# Patient Record
Sex: FEMALE | Race: WHITE | NOT HISPANIC OR LATINO | Employment: UNEMPLOYED | URBAN - NONMETROPOLITAN AREA
[De-identification: names, ages, dates, MRNs, and addresses within clinical notes are randomized per-mention and may not be internally consistent; named-entity substitution may affect disease eponyms.]

---
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
## 2022-05-10 ENCOUNTER — HOSPITAL ENCOUNTER (EMERGENCY)
Facility: HOSPITAL | Age: 22
Discharge: HOME | End: 2022-05-11
Attending: EMERGENCY MEDICINE | Admitting: EMERGENCY MEDICINE

## 2022-05-10 ENCOUNTER — APPOINTMENT (OUTPATIENT)
Dept: GENERAL RADIOLOGY | Facility: HOSPITAL | Age: 22
End: 2022-05-10

## 2022-05-10 DIAGNOSIS — R10.33 PERIUMBILICAL ABDOMINAL PAIN: Primary | ICD-10-CM

## 2022-05-10 LAB
ALBUMIN SERPL-MCNC: 4.44 G/DL (ref 3.5–5.2)
ALBUMIN/GLOB SERPL: 1.5 G/DL
ALP SERPL-CCNC: 84 U/L (ref 39–117)
ALT SERPL W P-5'-P-CCNC: 18 U/L (ref 1–33)
ANION GAP SERPL CALCULATED.3IONS-SCNC: 11.2 MMOL/L (ref 5–15)
AST SERPL-CCNC: 16 U/L (ref 1–32)
BASOPHILS # BLD AUTO: 0.06 10*3/MM3 (ref 0–0.2)
BASOPHILS NFR BLD AUTO: 0.5 % (ref 0–1.5)
BILIRUB SERPL-MCNC: 0.3 MG/DL (ref 0–1.2)
BILIRUB UR QL STRIP: NEGATIVE
BUN SERPL-MCNC: 10 MG/DL (ref 6–20)
BUN/CREAT SERPL: 13.2 (ref 7–25)
CALCIUM SPEC-SCNC: 9.7 MG/DL (ref 8.6–10.5)
CHLORIDE SERPL-SCNC: 100 MMOL/L (ref 98–107)
CLARITY UR: CLEAR
CO2 SERPL-SCNC: 22.8 MMOL/L (ref 22–29)
COLOR UR: YELLOW
CREAT SERPL-MCNC: 0.76 MG/DL (ref 0.57–1)
DEPRECATED RDW RBC AUTO: 41.3 FL (ref 37–54)
EGFRCR SERPLBLD CKD-EPI 2021: 114.5 ML/MIN/1.73
EOSINOPHIL # BLD AUTO: 0.05 10*3/MM3 (ref 0–0.4)
EOSINOPHIL NFR BLD AUTO: 0.4 % (ref 0.3–6.2)
ERYTHROCYTE [DISTWIDTH] IN BLOOD BY AUTOMATED COUNT: 13 % (ref 12.3–15.4)
GLOBULIN UR ELPH-MCNC: 3 GM/DL
GLUCOSE SERPL-MCNC: 109 MG/DL (ref 65–99)
GLUCOSE UR STRIP-MCNC: NEGATIVE MG/DL
HCG SERPL QL: NEGATIVE
HCT VFR BLD AUTO: 38.8 % (ref 34–46.6)
HGB BLD-MCNC: 13.1 G/DL (ref 12–15.9)
HGB UR QL STRIP.AUTO: NEGATIVE
IMM GRANULOCYTES # BLD AUTO: 0.06 10*3/MM3 (ref 0–0.05)
IMM GRANULOCYTES NFR BLD AUTO: 0.5 % (ref 0–0.5)
KETONES UR QL STRIP: NEGATIVE
LEUKOCYTE ESTERASE UR QL STRIP.AUTO: NEGATIVE
LYMPHOCYTES # BLD AUTO: 2.63 10*3/MM3 (ref 0.7–3.1)
LYMPHOCYTES NFR BLD AUTO: 21.8 % (ref 19.6–45.3)
MCH RBC QN AUTO: 29.5 PG (ref 26.6–33)
MCHC RBC AUTO-ENTMCNC: 33.8 G/DL (ref 31.5–35.7)
MCV RBC AUTO: 87.4 FL (ref 79–97)
MONOCYTES # BLD AUTO: 0.66 10*3/MM3 (ref 0.1–0.9)
MONOCYTES NFR BLD AUTO: 5.5 % (ref 5–12)
NEUTROPHILS NFR BLD AUTO: 71.3 % (ref 42.7–76)
NEUTROPHILS NFR BLD AUTO: 8.62 10*3/MM3 (ref 1.7–7)
NITRITE UR QL STRIP: NEGATIVE
NRBC BLD AUTO-RTO: 0 /100 WBC (ref 0–0.2)
PH UR STRIP.AUTO: 7 [PH] (ref 5–8)
PLATELET # BLD AUTO: 369 10*3/MM3 (ref 140–450)
PMV BLD AUTO: 10 FL (ref 6–12)
POTASSIUM SERPL-SCNC: 3.9 MMOL/L (ref 3.5–5.2)
PROT SERPL-MCNC: 7.4 G/DL (ref 6–8.5)
PROT UR QL STRIP: NEGATIVE
RBC # BLD AUTO: 4.44 10*6/MM3 (ref 3.77–5.28)
SODIUM SERPL-SCNC: 134 MMOL/L (ref 136–145)
SP GR UR STRIP: 1.01 (ref 1–1.03)
UROBILINOGEN UR QL STRIP: NORMAL
WBC NRBC COR # BLD: 12.08 10*3/MM3 (ref 3.4–10.8)

## 2022-05-10 PROCEDURE — 99283 EMERGENCY DEPT VISIT LOW MDM: CPT

## 2022-05-10 PROCEDURE — 93005 ELECTROCARDIOGRAM TRACING: CPT | Performed by: EMERGENCY MEDICINE

## 2022-05-10 PROCEDURE — 81003 URINALYSIS AUTO W/O SCOPE: CPT | Performed by: EMERGENCY MEDICINE

## 2022-05-10 PROCEDURE — 85025 COMPLETE CBC W/AUTO DIFF WBC: CPT | Performed by: EMERGENCY MEDICINE

## 2022-05-10 PROCEDURE — 36415 COLL VENOUS BLD VENIPUNCTURE: CPT

## 2022-05-10 PROCEDURE — 71045 X-RAY EXAM CHEST 1 VIEW: CPT

## 2022-05-10 PROCEDURE — 84703 CHORIONIC GONADOTROPIN ASSAY: CPT | Performed by: EMERGENCY MEDICINE

## 2022-05-10 PROCEDURE — 80053 COMPREHEN METABOLIC PANEL: CPT | Performed by: EMERGENCY MEDICINE

## 2022-05-10 RX ORDER — SODIUM CHLORIDE 0.9 % (FLUSH) 0.9 %
10 SYRINGE (ML) INJECTION AS NEEDED
Status: DISCONTINUED | OUTPATIENT
Start: 2022-05-10 | End: 2022-05-11 | Stop reason: HOSPADM

## 2022-05-11 ENCOUNTER — APPOINTMENT (OUTPATIENT)
Dept: ULTRASOUND IMAGING | Facility: HOSPITAL | Age: 22
End: 2022-05-11

## 2022-05-11 ENCOUNTER — APPOINTMENT (OUTPATIENT)
Dept: CT IMAGING | Facility: HOSPITAL | Age: 22
End: 2022-05-11

## 2022-05-11 VITALS
WEIGHT: 185 LBS | HEART RATE: 98 BPM | OXYGEN SATURATION: 99 % | SYSTOLIC BLOOD PRESSURE: 120 MMHG | BODY MASS INDEX: 34.04 KG/M2 | HEIGHT: 62 IN | DIASTOLIC BLOOD PRESSURE: 80 MMHG | RESPIRATION RATE: 16 BRPM | TEMPERATURE: 98 F

## 2022-05-11 LAB
QT INTERVAL: 372 MS
QTC INTERVAL: 401 MS

## 2022-05-11 PROCEDURE — 74176 CT ABD & PELVIS W/O CONTRAST: CPT

## 2022-05-11 RX ORDER — HYDROCODONE BITARTRATE AND ACETAMINOPHEN 5; 325 MG/1; MG/1
1 TABLET ORAL EVERY 8 HOURS PRN
Qty: 6 TABLET | Refills: 0 | Status: SHIPPED | OUTPATIENT
Start: 2022-05-11

## 2022-05-11 RX ORDER — HYDROCODONE BITARTRATE AND ACETAMINOPHEN 7.5; 325 MG/1; MG/1
1 TABLET ORAL ONCE
Status: COMPLETED | OUTPATIENT
Start: 2022-05-11 | End: 2022-05-11

## 2022-05-11 RX ADMIN — HYDROCODONE BITARTRATE AND ACETAMINOPHEN 1 TABLET: 7.5; 325 TABLET ORAL at 02:02

## 2022-05-11 NOTE — ED PROVIDER NOTES
Subjective   Patient presents to ER with abdominal pain which started at six PM. It began jesi-umbilical area, but now is in the RLQ      Abdominal Pain  Pain location:  RLQ  Pain quality: aching and cramping    Pain radiates to:  Does not radiate  Pain severity:  Moderate  Onset quality:  Gradual  Duration:  6 hours  Timing:  Constant  Progression:  Worsening  Chronicity:  New  Relieved by:  Nothing  Worsened by:  Nothing  Ineffective treatments:  None tried  Associated symptoms: anorexia        Review of Systems   Constitutional: Positive for activity change and appetite change.   HENT: Negative.    Eyes: Negative.    Respiratory: Negative.    Cardiovascular: Negative.    Gastrointestinal: Positive for abdominal pain and anorexia.   Endocrine: Negative.    Genitourinary: Negative.    Musculoskeletal: Negative.    Skin: Negative.    Allergic/Immunologic: Negative.    Neurological: Negative.    Hematological: Negative.    Psychiatric/Behavioral: Negative.        No past medical history on file.    No Known Allergies    No past surgical history on file.    No family history on file.    Social History     Socioeconomic History   • Marital status: Single           Objective   Physical Exam  Vitals and nursing note reviewed.   Constitutional:       Appearance: Normal appearance.   HENT:      Head: Normocephalic.      Nose: Nose normal.      Mouth/Throat:      Mouth: Mucous membranes are moist.   Eyes:      Pupils: Pupils are equal, round, and reactive to light.   Cardiovascular:      Rate and Rhythm: Tachycardia present.      Pulses: Normal pulses.   Pulmonary:      Effort: Pulmonary effort is normal.   Abdominal:      Tenderness: There is abdominal tenderness.      Comments: Some guarding RLQ, bowel sounds diminished   Musculoskeletal:      Cervical back: Normal range of motion.   Skin:     General: Skin is warm.      Capillary Refill: Capillary refill takes less than 2 seconds.   Neurological:      General: No focal  deficit present.      Mental Status: She is alert.   Psychiatric:         Mood and Affect: Mood normal.         Procedures           ED Course  ED Course as of 05/11/22 0144   Wed May 11, 2022   0002 eCG 22:40 NSR with sinus arrhythmia, rate 70. Normal ECG. QT/QTc 372/401 [SIMON]   0141 cT : fatty liver [SIMON]      ED Course User Index  [SIMON] Fernando Wills MD                                                 [facility]    Final diagnoses:   Periumbilical abdominal pain       ED Disposition  ED Disposition     ED Disposition   Discharge    Condition   Stable    Comment   --             No follow-up provider specified.       Medication List      New Prescriptions    HYDROcodone-acetaminophen 5-325 MG per tablet  Commonly known as: NORCO  Take 1 tablet by mouth Every 8 (Eight) Hours As Needed for Moderate Pain .           Where to Get Your Medications      These medications were sent to [facility] - [place], [place] - [address] [facility] [address] - [phone]  - [phone] FX  [address] [facility] [address]    Phone: [phone]   · HYDROcodone-acetaminophen 5-325 MG per tablet          Fernando Wills MD  05/11/22 0144

## 2022-05-11 NOTE — DISCHARGE INSTRUCTIONS
Call one of the offices below to establish a primary care provider.  If you are unable to get an appointment and feel it is an emergency and need to be seen immediately please return to the Emergency Department.    Call one of the office below to set up a primary care provider.    Dr. Hardy Haley                                                                                                       [address] [facility] [address]  [phone]    Dr. Muller, Dr. SYLVIA Correa, Dr. DEON Correa ([facility])  [address] [facility] [address]  [phone]    Dr. Easley, Dr. Green, Dr. Obrien ([facility])  [address] [facility] [address]  [phone]    Dr. Arellano  [address] [facility] [address]  [phone]    Dr. Smith, Dr. Muro, Dr. Johnson, Dr. Siddiqui ([facility])  [address] [place] [address] [address]  [facility] [address]  [phone]    Dr. Alejandra Beck  [address] [facility] [place] [address]  [phone]    Dr. Dee Evans  [address]   [address]  [facility] [address]  [phone]    Dr. Haley  [address] [facility] [address]  [phone]    Dr. Shipley, Dr. Rodgers  [address] [facility] [place] [address]  [phone]    Dr. Lopes  [address]                                                              [address]  [facility] [address]  [phone]    Dr. Bates  [address] [facility] [address]  [phone]    Dr. Chiara Barnett  [address]  [address]  [place] [address]  [phone]    Dr. Godinez and [facility]   [address] [facility]  [address]  [place] [address]  [phone]

## 2023-05-22 ENCOUNTER — HOSPITAL ENCOUNTER (EMERGENCY)
Facility: HOSPITAL | Age: 23
Discharge: HOME | End: 2023-05-22
Attending: STUDENT IN AN ORGANIZED HEALTH CARE EDUCATION/TRAINING PROGRAM | Admitting: STUDENT IN AN ORGANIZED HEALTH CARE EDUCATION/TRAINING PROGRAM
Payer: COMMERCIAL

## 2023-05-22 VITALS
RESPIRATION RATE: 16 BRPM | BODY MASS INDEX: 34.23 KG/M2 | HEART RATE: 61 BPM | DIASTOLIC BLOOD PRESSURE: 85 MMHG | TEMPERATURE: 97.9 F | OXYGEN SATURATION: 99 % | WEIGHT: 186 LBS | HEIGHT: 62 IN | SYSTOLIC BLOOD PRESSURE: 132 MMHG

## 2023-05-22 DIAGNOSIS — H72.92 EAR DRUM PERFORATION, LEFT: Primary | ICD-10-CM

## 2023-05-22 PROCEDURE — 99282 EMERGENCY DEPT VISIT SF MDM: CPT

## 2023-05-22 RX ORDER — OFLOXACIN 3 MG/ML
5 SOLUTION AURICULAR (OTIC) DAILY
Qty: 2 ML | Refills: 0 | Status: SHIPPED | OUTPATIENT
Start: 2023-05-22 | End: 2023-05-29

## 2023-05-22 NOTE — ED PROVIDER NOTES
Subjective   History of Present Illness     Jeanna is a 22 yo presenting to the ED for ear pain. Patient reports that she was cleaning her (L) ear out today with a q tip and following that she can not hear in her left ear. Denies pain. She feels like there is something lodged in her ear. She denies fevers, cough, congestion or other infectious like symptoms. No other trauma to the ear.     Review of Systems   Constitutional: Negative.  Negative for activity change and fever.   HENT: Negative.  Negative for congestion.         Hearing loss. Denies auricular pain   Respiratory: Negative.  Negative for cough and shortness of breath.    Cardiovascular: Negative.  Negative for chest pain.   Gastrointestinal: Negative.  Negative for abdominal pain.   Endocrine: Negative.    Genitourinary: Negative.  Negative for dysuria.   Skin: Negative.    Neurological: Negative.  Negative for dizziness, weakness and light-headedness.   Psychiatric/Behavioral: Negative.    All other systems reviewed and are negative.      No past medical history on file.    No Known Allergies    No past surgical history on file.    No family history on file.    Social History     Socioeconomic History   • Marital status: Single           Objective   Physical Exam  Constitutional:       Appearance: Normal appearance.   HENT:      Head: Normocephalic and atraumatic.      Right Ear: Tympanic membrane normal.      Ears:      Comments: No TM can be seen in left ear. Granulated tissue possibly in ear. No foreign body noted. d     Nose: Nose normal. No congestion or rhinorrhea.      Mouth/Throat:      Mouth: Mucous membranes are moist.      Pharynx: No oropharyngeal exudate or posterior oropharyngeal erythema.   Eyes:      Extraocular Movements: Extraocular movements intact.      Pupils: Pupils are equal, round, and reactive to light.   Cardiovascular:      Rate and Rhythm: Normal rate and regular rhythm.   Pulmonary:      Effort: Pulmonary effort is normal.  No respiratory distress.      Breath sounds: Normal breath sounds.   Abdominal:      General: Abdomen is flat. Bowel sounds are normal. There is no distension.   Musculoskeletal:         General: No swelling. Normal range of motion.      Cervical back: Normal range of motion. No rigidity or tenderness.   Skin:     General: Skin is warm.      Capillary Refill: Capillary refill takes less than 2 seconds.      Coloration: Skin is not jaundiced or pale.   Neurological:      General: No focal deficit present.      Mental Status: She is alert and oriented to person, place, and time.         Procedures           ED Course                                           Medical Decision Making  Jeanna is a 22 yo presenting with hearing loss after cleaning ear with q tip. Patient is afebrile and hemodynamically stale on arrival. Physical exam possible ruptured TM on left ear. No impacted foreign body noted. Patient is given ear drops for further evaluation and given referral to Dr. Tony.     Ear drum perforation, left: complicated acute illness or injury  Risk  Prescription drug management.          Final diagnoses:   Ear drum perforation, left       ED Disposition  ED Disposition     ED Disposition   Discharge    Condition   Stable    Comment   --             [place] Tony MD  [address] [facility] [address]  [facility] [address]  [phone]    Go in 2 days  call to set up appointment         Medication List      New Prescriptions    ofloxacin 0.3 % otic solution  Commonly known as: FLOXIN  Administer 5 drops to the right ear Daily for 7 days.           Where to Get Your Medications      These medications were sent to [facility] - [place], [place] - [address] [facility] [address] - [phone]  - [phone] FX  [address] [facility] [address], [facility] [address]    Phone: [phone]   · ofloxacin 0.3 % otic solution          Kenna Blackman MD  05/22/23 5674

## 2023-05-22 NOTE — ED NOTES
MEDICAL SCREENING:    Reason for Visit: possible fb in ear, could be wax.     Patient initially seen in triage.  The patient was advised further evaluation and diagnostic testing will be needed, some of the treatment and testing will be initiated in the lobby in order to begin the process.  The patient will be returned to the waiting area for the time being and possibly be re-assessed by a subsequent ED provider.  The patient will be brought back to the treatment area in as timely manner as possible.         Greg Hunt II, PA  05/22/23 0123

## 2023-05-22 NOTE — DISCHARGE INSTRUCTIONS
Please call Dr. Tony/ENT to discuss further management of your ear, may also see your primary care physician in 2-3 days. Please avoid adding anything into your ear other than ear drops. Please return if symptoms worsen.

## 2024-05-06 LAB
EXTERNAL ABO GROUPING: NORMAL
EXTERNAL ANTIBODY SCREEN: NEGATIVE
EXTERNAL HEPATITIS B SURFACE ANTIGEN: NEGATIVE
EXTERNAL HEPATITIS C AB: NEGATIVE
EXTERNAL RH FACTOR: POSITIVE
EXTERNAL SYPHILIS RPR SCREEN: NORMAL
HIV1 P24 AG SERPL QL IA: NEGATIVE

## 2024-08-11 ENCOUNTER — HOSPITAL ENCOUNTER (EMERGENCY)
Facility: HOSPITAL | Age: 24
Discharge: HOME | End: 2024-08-11
Attending: SURGERY | Admitting: SURGERY
Payer: COMMERCIAL

## 2024-08-11 ENCOUNTER — NURSE TRIAGE (OUTPATIENT)
Dept: CALL CENTER | Facility: HOSPITAL | Age: 24
End: 2024-08-11
Payer: COMMERCIAL

## 2024-08-11 VITALS
SYSTOLIC BLOOD PRESSURE: 142 MMHG | BODY MASS INDEX: 35.33 KG/M2 | DIASTOLIC BLOOD PRESSURE: 87 MMHG | WEIGHT: 192 LBS | HEIGHT: 62 IN | TEMPERATURE: 98.4 F | HEART RATE: 126 BPM | RESPIRATION RATE: 14 BRPM | OXYGEN SATURATION: 99 %

## 2024-08-11 DIAGNOSIS — I10 PRIMARY HYPERTENSION: Primary | ICD-10-CM

## 2024-08-11 DIAGNOSIS — G43.819 OTHER MIGRAINE WITHOUT STATUS MIGRAINOSUS, INTRACTABLE: ICD-10-CM

## 2024-08-11 PROCEDURE — 99283 EMERGENCY DEPT VISIT LOW MDM: CPT

## 2024-08-11 RX ORDER — BUTALBITAL, ACETAMINOPHEN AND CAFFEINE 50; 325; 40 MG/1; MG/1; MG/1
1 TABLET ORAL ONCE
Status: COMPLETED | OUTPATIENT
Start: 2024-08-11 | End: 2024-08-11

## 2024-08-11 RX ADMIN — BUTALBITAL, ACETAMINOPHEN AND CAFFEINE 1 TABLET: 325; 50; 40 TABLET ORAL at 18:31

## 2024-08-11 NOTE — TELEPHONE ENCOUNTER
"Caller advises that she has been having headaches and facial flushing for past 2 weeks with worsening over past 2 days. Checked BP and was 133/90. Caller is 18 weeks pregnant and also has a Hx. Of DM. Advised caller to go to ED at [facility] for further evaluation for borderline PIH. Verbalizes understanding and is agreeable and will go.           Reason for Disposition   [1] Systolic BP  >= 160 OR Diastolic >= 100 AND [2] cardiac (e.g., breathing difficulty, chest pain) or neurologic symptoms (e.g., new-onset blurred or double vision, unsteady gait)    Additional Information   Negative: Difficult to awaken or acting confused (e.g., disoriented, slurred speech)   Negative: SEVERE difficulty breathing (e.g., struggling for each breath, speaks in single words)   Negative: [1] Weakness of the face, arm or leg on one side of the body AND [2] new-onset   Negative: [1] Numbness (i.e., loss of sensation) of the face, arm or leg on one side of the body AND [2] new-onset   Negative: [1] Chest pain lasts > 5 minutes AND [2] history of heart disease (i.e., heart attack, bypass surgery, angina, angioplasty, CHF)   Negative: [1] Chest pain AND [2] took nitrogylcerin AND [3] pain was not relieved   Negative: Sounds like a life-threatening emergency to the triager   Negative: Symptom is main concern (e.g., headache, chest pain)   Negative: Low blood pressure is main concern    Answer Assessment - Initial Assessment Questions  1. BLOOD PRESSURE: \"What is the blood pressure?\" \"Did you take at least two measurements 5 minutes apart?\"      133/90  2. ONSET: \"When did you take your blood pressure?\"      30 minute before calling 4pm  3. HOW: \"How did you take your blood pressure?\" (e.g., automatic home BP monitor, visiting nurse)      Automatic cuff  4. HISTORY: \"Do you have a history of high blood pressure?\"      No   5. MEDICINES: \"Are you taking any medicines for blood pressure?\" \"Have you missed any doses recently?\"      No  6. OTHER " "SYMPTOMS: \"Do you have any symptoms?\" (e.g., blurred vision, chest pain, difficulty breathing, headache, weakness)      Headache and facial flushing  7. PREGNANCY: \"Is there any chance you are pregnant?\" \"When was your last menstrual period?\"      Yes 18 weeks    Protocols used: Blood Pressure - High-ADULT-AH    "

## 2024-08-11 NOTE — ED PROVIDER NOTES
Subjective   History of Present Illness  Ms. La is a 24-year-old female who presents with complaints of migraines and headaches.  She is 18 weeks pregnant.  Blood pressure at home was systolic of 138.  Systolic here 142.  She is established with an OB/GYN.  She also reports a headache that has been present for the past week.  Is been located in the front part of her head.        Review of Systems   Constitutional: Negative.    HENT: Negative.     Eyes: Negative.    Respiratory: Negative.     Cardiovascular: Negative.    Gastrointestinal: Negative.    Endocrine: Negative.    Genitourinary: Negative.    Musculoskeletal: Negative.    Skin: Negative.    Allergic/Immunologic: Negative.    Neurological: Negative.    Hematological: Negative.    Psychiatric/Behavioral: Negative.         No past medical history on file.    No Known Allergies    No past surgical history on file.    No family history on file.    Social History     Socioeconomic History    Marital status: Single           Objective   Physical Exam  Constitutional:       Appearance: Normal appearance.   HENT:      Head: Normocephalic.      Nose: Nose normal.      Mouth/Throat:      Mouth: Mucous membranes are moist.   Eyes:      Pupils: Pupils are equal, round, and reactive to light.   Cardiovascular:      Rate and Rhythm: Normal rate and regular rhythm.      Pulses: Normal pulses.   Pulmonary:      Effort: Pulmonary effort is normal.   Abdominal:      General: Abdomen is flat.   Musculoskeletal:         General: Normal range of motion.      Cervical back: Normal range of motion.   Skin:     General: Skin is warm.      Capillary Refill: Capillary refill takes less than 2 seconds.   Neurological:      General: No focal deficit present.      Mental Status: She is alert.   Psychiatric:         Mood and Affect: Mood normal.         Behavior: Behavior normal.         Procedures           ED Course  ED Course as of 08/11/24 1924   Sun Aug 11, 2024   6674 Heart  Rate(!): 126 [EA]      ED Course User Index  [EA] Justine Alba MD                                             Medical Decision Making  Ms. La is a 24-year-old female who presents with headache and very mild hypertension.  She is 18 weeks pregnant and would not meet criteria for preeclampsia at this time.  Treated her headache with a tablet of Fioricet and recommended Tylenol for home headaches.  Recommended she call her OB/GYN on Monday to further follow her low-grade hypertension.  Patient expressed understanding and felt comfortable to be discharged home.    Problems Addressed:  Other migraine without status migrainosus, intractable: complicated acute illness or injury  Primary hypertension: complicated acute illness or injury    Risk  Prescription drug management.        Final diagnoses:   Primary hypertension   Other migraine without status migrainosus, intractable       ED Disposition  ED Disposition       ED Disposition   Discharge    Condition   Stable    Comment   --               ob/gyn  Patient established with ob/gyn, recommended patient call on Monday to discuss ER presentation  In 1 day           Medication List      No changes were made to your prescriptions during this visit.            Justine Alba MD  08/11/24 0664

## 2024-09-13 ENCOUNTER — HOSPITAL ENCOUNTER (OUTPATIENT)
Facility: HOSPITAL | Age: 24
Discharge: HOME | End: 2024-09-13
Attending: OBSTETRICS & GYNECOLOGY | Admitting: OBSTETRICS & GYNECOLOGY
Payer: COMMERCIAL

## 2024-09-13 VITALS
BODY MASS INDEX: 35.48 KG/M2 | WEIGHT: 192.8 LBS | RESPIRATION RATE: 20 BRPM | TEMPERATURE: 98.2 F | HEART RATE: 96 BPM | SYSTOLIC BLOOD PRESSURE: 113 MMHG | DIASTOLIC BLOOD PRESSURE: 73 MMHG | HEIGHT: 62 IN

## 2024-09-13 PROBLEM — O42.90 AMNIOTIC FLUID LEAKING: Status: ACTIVE | Noted: 2024-09-13

## 2024-09-13 LAB — A1 MICROGLOB PLACENTAL VAG QL: NEGATIVE

## 2024-09-13 PROCEDURE — 84112 EVAL AMNIOTIC FLUID PROTEIN: CPT | Performed by: OBSTETRICS & GYNECOLOGY

## 2024-09-13 PROCEDURE — G0463 HOSPITAL OUTPT CLINIC VISIT: HCPCS

## 2024-09-13 RX ORDER — ASPIRIN 81 MG/1
81 TABLET ORAL DAILY
COMMUNITY

## 2024-09-13 RX ORDER — INSULIN LISPRO 100 [IU]/ML
14 INJECTION, SOLUTION INTRAVENOUS; SUBCUTANEOUS
COMMUNITY

## 2024-09-13 RX ORDER — INSULIN GLARGINE 100 [IU]/ML
22 INJECTION, SOLUTION SUBCUTANEOUS 2 TIMES DAILY
COMMUNITY

## 2024-09-13 RX ORDER — PRENATAL VIT NO.126/IRON/FOLIC 28MG-0.8MG
1 TABLET ORAL DAILY
COMMUNITY

## 2024-09-13 NOTE — LETTER
September 13, 2024     Patient: Jeanna La   YOB: 2000   Date of Visit: 9/13/2024       To Whom It May Concern:    Please excuse Steven Blackman from work on 9/13/2024.           Sincerely,        Marleen Little RN  [facility]/D

## 2024-09-14 NOTE — H&P
"MANOHAR Sage  Obstetric History and Physical    Chief Complaint   Patient presents with    Leaking Fluid     PT C/O POSSIBLE LEAKING FLUID AT APPROX 1200 TODAY. REPORTS \"SOAKING PANTIES\". DENIES VAG BLEEDING. PT RECEIVES PNC AT [facility] IN [place] AND DR. TRAN AT        Subjective     Patient is a 24 y.o. female  currently at 23w2d, who presents with LOF.  Amnisure negative.  Pt has Class B DM and is on insulin followed by [facility] in [place].  Her BS is 90 here per her dexcom device.     Her prenatal care is complicated by  diabetes  pre-gestational.  Her previous obstetric/gynecological history is noted for is non-contributory.    The following portions of the patients history were reviewed and updated as appropriate: current medications, allergies, past medical history, past surgical history, past family history, past social history, and problem list .       Prenatal Information:   Maternal Prenatal Labs  Blood Type No results found for: \"ABO\"   Rh Status No results found for: \"RH\"   Antibody Screen No results found for: \"ABSCRN\"   Rapid Urine Drug Screen No results found for: \"AMPMETHU\", \"BARBITSCNUR\", \"LABBENZSCN\", \"LABMETHSCN\", \"LABOPIASCN\", \"THCURSCR\", \"COCAINEUR\", \"AMPHETSCREEN\", \"PROPOXSCN\", \"BUPRENORSCNU\", \"METAMPSCNUR\", \"OXYCODONESCN\", \"TRICYCLICSCN\"   Group B Strep Culture No results found for: \"GBSANTIGEN\"           External Prenatal Results       Pregnancy Outside Results - Transcribed From Office Records - See Scanned Records For Details       Test Value Date Time    ABO ^ O  24     Rh ^ Positive  24     Antibody Screen ^ Negative  24     Varicella IgG       Rubella       Hgb       Hct       HgB A1c        1h GTT       3h GTT Fasting       3h GTT 1 hour       3h GTT 2 hour       3h GTT 3 hour        Gonorrhea (discrete)       Chlamydia (discrete)       RPR ^ Non-Reactive  24     Syphils cascade: TP-Ab (FTA)       TP-Ab       TP-Ab (EIA)       TPPA       HBsAg ^ Negative  " 24     Herpes Simplex Virus PCR       Herpes Simplex VIrus Culture       HIV ^ Negative  24     Hep C RNA Quant PCR       Hep C Antibody ^ NEGATIVE  24     AFP       NIPT       Cystic Fibroisis        Group B Strep       GBS Susceptibility to Clindamycin       GBS Susceptibility to Erythromycin       Fetal Fibronectin       Genetic Testing, Maternal Blood                 Drug Screening       Test Value Date Time    Urine Drug Screen       Amphetamine Screen       Barbiturate Screen       Benzodiazepine Screen       Methadone Screen       Phencyclidine Screen       Opiates Screen       THC Screen       Cocaine Screen       Propoxyphene Screen       Buprenorphine Screen       Methamphetamine Screen       Oxycodone Screen       Tricyclic Antidepressants Screen                 Legend    ^: Historical                              Past OB History:     OB History    Para Term  AB Living   1 0 0 0 0 0   SAB IAB Ectopic Molar Multiple Live Births   0 0 0 0 0 0      # Outcome Date GA Lbr Stephen/2nd Weight Sex Type Anes PTL Lv   1 Current                Past Medical History: Past Medical History:   Diagnosis Date    Diabetes mellitus     TYPE II ON INSULIN      Past Surgical History Past Surgical History:   Procedure Laterality Date    NO PAST SURGERIES        Family History: No family history on file.   Social History:  reports that she has never smoked. She has never used smokeless tobacco.   reports no history of alcohol use.   reports no history of drug use.        Review of Systems                  Review of Systems   Pertinent items are noted in HPI, all other systems reviewed and negative      Objective     Vital Signs Range for the last 24 hours  Temperature: Temp:  [98.2 °F (36.8 °C)] 98.2 °F (36.8 °C)   Temp Source: Temp src: Oral   BP: BP: (113)/(73) 113/73   Pulse: Heart Rate:  [96] 96   Respirations: Resp:  [20] 20   Weight: Weight:  [87.5 kg (192 lb 12.8 oz)] 87.5 kg (192 lb 12.8  oz)     Physical Examination: General appearance - alert, well appearing, and in no distress  Abdomen - soft, nontender, nondistended, no masses or organomegaly  Extremities - peripheral pulses normal, no pedal edema, no clubbing or cyanosis          Assessment & Plan       Amniotic fluid leaking      Assessment & Plan    Assessment/Plan:  1.  Intrauterine pregnancy at 23w2d weeks gestation with reassuring fetal status.    2.  False Rom- negative amnisure.  Pt reassured.  Keep f/u appt as scheduled.   3. Class B DM- continue insulin as Rx and f/u MFM.       Berta Barbour DO  9/13/2024  20:02 EDT

## 2024-09-14 NOTE — DISCHARGE SUMMARY
"Patient is a 24 y.o. female  currently at 23w2d, who presents with LOF.  Amnisure negative.  Pt has Class B DM and is on insulin followed by [facility] in [place].  Her BS is 90 here per her dexcom device.     Her prenatal care is complicated by  diabetes  pre-gestational.  Her previous obstetric/gynecological history is noted for is non-contributory.    The following portions of the patients history were reviewed and updated as appropriate: current medications, allergies, past medical history, past surgical history, past family history, past social history, and problem list .       Prenatal Information:   Maternal Prenatal Labs  Blood Type No results found for: \"ABO\"   Rh Status No results found for: \"RH\"   Antibody Screen No results found for: \"ABSCRN\"   Rapid Urine Drug Screen No results found for: \"AMPMETHU\", \"BARBITSCNUR\", \"LABBENZSCN\", \"LABMETHSCN\", \"LABOPIASCN\", \"THCURSCR\", \"COCAINEUR\", \"AMPHETSCREEN\", \"PROPOXSCN\", \"BUPRENORSCNU\", \"METAMPSCNUR\", \"OXYCODONESCN\", \"TRICYCLICSCN\"   Group B Strep Culture No results found for: \"GBSANTIGEN\"           External Prenatal Results       Pregnancy Outside Results - Transcribed From Office Records - See Scanned Records For Details       Test Value Date Time    ABO ^ O  24     Rh ^ Positive  24     Antibody Screen ^ Negative  24     Varicella IgG       Rubella       Hgb       Hct       HgB A1c        1h GTT       3h GTT Fasting       3h GTT 1 hour       3h GTT 2 hour       3h GTT 3 hour        Gonorrhea (discrete)       Chlamydia (discrete)       RPR ^ Non-Reactive  24     Syphils cascade: TP-Ab (FTA)       TP-Ab       TP-Ab (EIA)       TPPA       HBsAg ^ Negative  24     Herpes Simplex Virus PCR       Herpes Simplex VIrus Culture       HIV ^ Negative  24     Hep C RNA Quant PCR       Hep C Antibody ^ NEGATIVE  24     AFP       NIPT       Cystic Fibroisis        Group B Strep       GBS Susceptibility to Clindamycin       GBS " Susceptibility to Erythromycin       Fetal Fibronectin       Genetic Testing, Maternal Blood                 Drug Screening       Test Value Date Time    Urine Drug Screen       Amphetamine Screen       Barbiturate Screen       Benzodiazepine Screen       Methadone Screen       Phencyclidine Screen       Opiates Screen       THC Screen       Cocaine Screen       Propoxyphene Screen       Buprenorphine Screen       Methamphetamine Screen       Oxycodone Screen       Tricyclic Antidepressants Screen                 Legend    ^: Historical                              Past OB History:     OB History    Para Term  AB Living   1 0 0 0 0 0   SAB IAB Ectopic Molar Multiple Live Births   0 0 0 0 0 0      # Outcome Date GA Lbr Stephen/2nd Weight Sex Type Anes PTL Lv   1 Current                Past Medical History: Past Medical History:   Diagnosis Date    Diabetes mellitus     TYPE II ON INSULIN      Past Surgical History Past Surgical History:   Procedure Laterality Date    NO PAST SURGERIES        Family History: No family history on file.   Social History:  reports that she has never smoked. She has never used smokeless tobacco.   reports no history of alcohol use.   reports no history of drug use.        Review of Systems                  Review of Systems   Pertinent items are noted in HPI, all other systems reviewed and negative      Objective     Vital Signs Range for the last 24 hours  Temperature: Temp:  [98.2 °F (36.8 °C)] 98.2 °F (36.8 °C)   Temp Source: Temp src: Oral   BP: BP: (113)/(73) 113/73   Pulse: Heart Rate:  [96] 96   Respirations: Resp:  [20] 20   Weight: Weight:  [87.5 kg (192 lb 12.8 oz)] 87.5 kg (192 lb 12.8 oz)     Physical Examination: General appearance - alert, well appearing, and in no distress  Abdomen - soft, nontender, nondistended, no masses or organomegaly  Extremities - peripheral pulses normal, no pedal edema, no clubbing or cyanosis          Assessment & Plan       Amniotic  fluid leaking      Assessment & Plan    Assessment/Plan:  1.  Intrauterine pregnancy at 23w2d weeks gestation with reassuring fetal status.    2.  False Rom- negative amnisure.  Pt reassured.  Keep f/u appt as scheduled.   3. Class B DM- continue insulin as Rx and f/u MFM.

## 2024-09-28 ENCOUNTER — NURSE TRIAGE (OUTPATIENT)
Dept: CALL CENTER | Facility: HOSPITAL | Age: 24
End: 2024-09-28
Payer: COMMERCIAL

## 2024-10-10 ENCOUNTER — HOSPITAL ENCOUNTER (OUTPATIENT)
Facility: HOSPITAL | Age: 24
Discharge: HOME | End: 2024-10-10
Attending: OBSTETRICS & GYNECOLOGY | Admitting: OBSTETRICS & GYNECOLOGY
Payer: COMMERCIAL

## 2024-10-10 VITALS
HEIGHT: 62 IN | BODY MASS INDEX: 35.7 KG/M2 | DIASTOLIC BLOOD PRESSURE: 63 MMHG | OXYGEN SATURATION: 99 % | TEMPERATURE: 98.1 F | WEIGHT: 194 LBS | RESPIRATION RATE: 18 BRPM | HEART RATE: 81 BPM | SYSTOLIC BLOOD PRESSURE: 109 MMHG

## 2024-10-10 PROCEDURE — G0463 HOSPITAL OUTPT CLINIC VISIT: HCPCS

## 2024-10-10 PROCEDURE — 59025 FETAL NON-STRESS TEST: CPT

## 2024-10-10 NOTE — LETTER
October 10, 2024     Patient: Jeanna La   YOB: 2000   Date of Visit: 10/10/2024       To Whom It May Concern:    Please excuse Steven Blackman from work on 10/10/24.       Sincerely,  Marleen Little RN

## 2024-10-11 NOTE — NON STRESS TEST
Jeanna La, a  at 27w1d with an JOSE of 2025, Date entered prior to episode creation, was seen at [facility] LABOR DELIVERY for a nonstress test.    Chief Complaint   Patient presents with    Decreased Fetal Movement     PT ARRIVED TO L/D FOR DFM. DENIES LOF/VB/CTX.       Patient Active Problem List   Diagnosis    Amniotic fluid leaking     10X10  Start Time:   Stop Time:       Interpretation A  Nonstress Test Interpretation A: Reactive  Comments A: VERIFIED BY ELOISE ROBINS RN

## 2024-12-01 ENCOUNTER — NURSE TRIAGE (OUTPATIENT)
Dept: CALL CENTER | Facility: HOSPITAL | Age: 24
End: 2024-12-01
Payer: COMMERCIAL

## 2024-12-01 NOTE — TELEPHONE ENCOUNTER
"Reason for Disposition   Health Information question, no triage required and triager able to answer question    Additional Information   Negative: [1] Caller is not with the adult (patient) AND [2] reporting urgent symptoms   Negative: Lab result questions   Negative: Medication questions   Negative: Caller can't be reached by phone   Negative: Caller has already spoken to PCP or another triager   Negative: RN needs further essential information from caller in order to complete triage   Negative: Requesting regular office appointment   Negative: [1] Caller requesting NON-URGENT health information AND [2] PCP's office is the best resource    Answer Assessment - Initial Assessment Questions  1. REASON FOR CALL or QUESTION: \"What is your reason for calling today?\" or \"How can I best help you?\" or \"What question do you have that I can help answer?\"      Question about meds she can use during pregnancy speficially ear drops    Protocols used: Information Only Call - No Triage-ADULT-    "

## 2024-12-11 LAB — EXTERNAL GROUP B STREP ANTIGEN: NEGATIVE

## 2024-12-23 ENCOUNTER — HOSPITAL ENCOUNTER (INPATIENT)
Facility: HOSPITAL | Age: 24
LOS: 4 days | Discharge: HOME | End: 2024-12-27
Attending: OBSTETRICS & GYNECOLOGY | Admitting: OBSTETRICS & GYNECOLOGY
Payer: COMMERCIAL

## 2024-12-23 DIAGNOSIS — O16.9 HYPERTENSION AFFECTING PREGNANCY, ANTEPARTUM: Primary | ICD-10-CM

## 2024-12-23 LAB
ABO GROUP BLD: NORMAL
ABO GROUP BLD: NORMAL
AMPHET+METHAMPHET UR QL: NEGATIVE
AMPHETAMINES UR QL: NEGATIVE
BARBITURATES UR QL SCN: NEGATIVE
BASOPHILS # BLD AUTO: 0.04 10*3/MM3 (ref 0–0.2)
BASOPHILS NFR BLD AUTO: 0.4 % (ref 0–1.5)
BENZODIAZ UR QL SCN: NEGATIVE
BLD GP AB SCN SERPL QL: NEGATIVE
BUPRENORPHINE SERPL-MCNC: NEGATIVE NG/ML
CANNABINOIDS SERPL QL: NEGATIVE
COCAINE UR QL: NEGATIVE
DEPRECATED RDW RBC AUTO: 43.6 FL (ref 37–54)
EOSINOPHIL # BLD AUTO: 0.03 10*3/MM3 (ref 0–0.4)
EOSINOPHIL NFR BLD AUTO: 0.3 % (ref 0.3–6.2)
ERYTHROCYTE [DISTWIDTH] IN BLOOD BY AUTOMATED COUNT: 14.1 % (ref 12.3–15.4)
FENTANYL UR-MCNC: NEGATIVE NG/ML
HCT VFR BLD AUTO: 34.2 % (ref 34–46.6)
HGB BLD-MCNC: 11.5 G/DL (ref 12–15.9)
IMM GRANULOCYTES # BLD AUTO: 0.04 10*3/MM3 (ref 0–0.05)
IMM GRANULOCYTES NFR BLD AUTO: 0.4 % (ref 0–0.5)
LYMPHOCYTES # BLD AUTO: 2.12 10*3/MM3 (ref 0.7–3.1)
LYMPHOCYTES NFR BLD AUTO: 22.7 % (ref 19.6–45.3)
MCH RBC QN AUTO: 28.6 PG (ref 26.6–33)
MCHC RBC AUTO-ENTMCNC: 33.6 G/DL (ref 31.5–35.7)
MCV RBC AUTO: 85.1 FL (ref 79–97)
METHADONE UR QL SCN: NEGATIVE
MONOCYTES # BLD AUTO: 0.73 10*3/MM3 (ref 0.1–0.9)
MONOCYTES NFR BLD AUTO: 7.8 % (ref 5–12)
NEUTROPHILS NFR BLD AUTO: 6.37 10*3/MM3 (ref 1.7–7)
NEUTROPHILS NFR BLD AUTO: 68.4 % (ref 42.7–76)
NRBC BLD AUTO-RTO: 0 /100 WBC (ref 0–0.2)
OPIATES UR QL: NEGATIVE
OXYCODONE UR QL SCN: NEGATIVE
PCP UR QL SCN: NEGATIVE
PLATELET # BLD AUTO: 236 10*3/MM3 (ref 140–450)
PMV BLD AUTO: 10.5 FL (ref 6–12)
RBC # BLD AUTO: 4.02 10*6/MM3 (ref 3.77–5.28)
RH BLD: POSITIVE
RH BLD: POSITIVE
T&S EXPIRATION DATE: NORMAL
TRICYCLICS UR QL SCN: NEGATIVE
WBC NRBC COR # BLD AUTO: 9.33 10*3/MM3 (ref 3.4–10.8)

## 2024-12-23 PROCEDURE — 86900 BLOOD TYPING SEROLOGIC ABO: CPT | Performed by: OBSTETRICS & GYNECOLOGY

## 2024-12-23 PROCEDURE — 86901 BLOOD TYPING SEROLOGIC RH(D): CPT

## 2024-12-23 PROCEDURE — 86780 TREPONEMA PALLIDUM: CPT | Performed by: OBSTETRICS & GYNECOLOGY

## 2024-12-23 PROCEDURE — G0463 HOSPITAL OUTPT CLINIC VISIT: HCPCS

## 2024-12-23 PROCEDURE — 86850 RBC ANTIBODY SCREEN: CPT | Performed by: OBSTETRICS & GYNECOLOGY

## 2024-12-23 PROCEDURE — 85025 COMPLETE CBC W/AUTO DIFF WBC: CPT | Performed by: OBSTETRICS & GYNECOLOGY

## 2024-12-23 PROCEDURE — 86900 BLOOD TYPING SEROLOGIC ABO: CPT

## 2024-12-23 PROCEDURE — 59025 FETAL NON-STRESS TEST: CPT

## 2024-12-23 PROCEDURE — 80307 DRUG TEST PRSMV CHEM ANLYZR: CPT | Performed by: OBSTETRICS & GYNECOLOGY

## 2024-12-23 PROCEDURE — 86901 BLOOD TYPING SEROLOGIC RH(D): CPT | Performed by: OBSTETRICS & GYNECOLOGY

## 2024-12-23 RX ORDER — INSULIN LISPRO 100 [IU]/ML
14-18 INJECTION, SOLUTION INTRAVENOUS; SUBCUTANEOUS
Status: DISCONTINUED | OUTPATIENT
Start: 2024-12-23 | End: 2024-12-23 | Stop reason: ALTCHOICE

## 2024-12-23 RX ORDER — MAGNESIUM HYDROXIDE 1200 MG/15ML
1000 LIQUID ORAL ONCE AS NEEDED
Status: DISCONTINUED | OUTPATIENT
Start: 2024-12-23 | End: 2024-12-24 | Stop reason: HOSPADM

## 2024-12-23 RX ORDER — ONDANSETRON 4 MG/1
4 TABLET, ORALLY DISINTEGRATING ORAL EVERY 6 HOURS PRN
Status: DISCONTINUED | OUTPATIENT
Start: 2024-12-23 | End: 2024-12-24 | Stop reason: HOSPADM

## 2024-12-23 RX ORDER — SODIUM CHLORIDE, SODIUM LACTATE, POTASSIUM CHLORIDE, CALCIUM CHLORIDE 600; 310; 30; 20 MG/100ML; MG/100ML; MG/100ML; MG/100ML
125 INJECTION, SOLUTION INTRAVENOUS CONTINUOUS
Status: ACTIVE | OUTPATIENT
Start: 2024-12-23 | End: 2024-12-26

## 2024-12-23 RX ORDER — ONDANSETRON 2 MG/ML
4 INJECTION INTRAMUSCULAR; INTRAVENOUS EVERY 6 HOURS PRN
Status: DISCONTINUED | OUTPATIENT
Start: 2024-12-23 | End: 2024-12-24 | Stop reason: HOSPADM

## 2024-12-23 RX ORDER — DEXTROSE MONOHYDRATE 25 G/50ML
25 INJECTION, SOLUTION INTRAVENOUS
Status: DISCONTINUED | OUTPATIENT
Start: 2024-12-23 | End: 2024-12-27 | Stop reason: HOSPADM

## 2024-12-23 RX ORDER — ACETAMINOPHEN 325 MG/1
650 TABLET ORAL EVERY 4 HOURS PRN
Status: DISCONTINUED | OUTPATIENT
Start: 2024-12-23 | End: 2024-12-24 | Stop reason: HOSPADM

## 2024-12-23 RX ORDER — PRENATAL VIT/IRON FUM/FOLIC AC 27MG-0.8MG
1 TABLET ORAL DAILY
Status: DISCONTINUED | OUTPATIENT
Start: 2024-12-24 | End: 2024-12-24 | Stop reason: SDUPTHER

## 2024-12-23 RX ORDER — GLUCAGON 1 MG/ML
1 KIT INJECTION
Status: DISCONTINUED | OUTPATIENT
Start: 2024-12-23 | End: 2024-12-27 | Stop reason: HOSPADM

## 2024-12-23 RX ORDER — INSULIN GLARGINE 100 [IU]/ML
28 INJECTION, SOLUTION SUBCUTANEOUS 2 TIMES DAILY
Status: DISCONTINUED | OUTPATIENT
Start: 2024-12-23 | End: 2024-12-27 | Stop reason: HOSPADM

## 2024-12-23 RX ORDER — BUTORPHANOL TARTRATE 1 MG/ML
1 INJECTION, SOLUTION INTRAMUSCULAR; INTRAVENOUS
Status: DISCONTINUED | OUTPATIENT
Start: 2024-12-23 | End: 2024-12-24 | Stop reason: HOSPADM

## 2024-12-23 RX ORDER — INSULIN LISPRO 100 [IU]/ML
2-9 INJECTION, SOLUTION INTRAVENOUS; SUBCUTANEOUS
Status: DISCONTINUED | OUTPATIENT
Start: 2024-12-23 | End: 2024-12-27 | Stop reason: HOSPADM

## 2024-12-23 RX ORDER — MISOPROSTOL 100 MCG
25 TABLET ORAL
Status: DISPENSED | OUTPATIENT
Start: 2024-12-23 | End: 2024-12-24

## 2024-12-23 RX ORDER — NICOTINE POLACRILEX 4 MG
15 LOZENGE BUCCAL
Status: DISCONTINUED | OUTPATIENT
Start: 2024-12-23 | End: 2024-12-27 | Stop reason: HOSPADM

## 2024-12-23 RX ORDER — TERBUTALINE SULFATE 1 MG/ML
0.2 INJECTION, SOLUTION SUBCUTANEOUS AS NEEDED
Status: DISCONTINUED | OUTPATIENT
Start: 2024-12-23 | End: 2024-12-24 | Stop reason: HOSPADM

## 2024-12-23 RX ORDER — MINERAL OIL
OIL (ML) MISCELLANEOUS ONCE
Status: DISCONTINUED | OUTPATIENT
Start: 2024-12-23 | End: 2024-12-23

## 2024-12-23 RX ORDER — SODIUM CHLORIDE 9 MG/ML
40 INJECTION, SOLUTION INTRAVENOUS AS NEEDED
Status: DISCONTINUED | OUTPATIENT
Start: 2024-12-23 | End: 2024-12-24 | Stop reason: HOSPADM

## 2024-12-23 RX ORDER — BUTORPHANOL TARTRATE 2 MG/ML
2 INJECTION, SOLUTION INTRAMUSCULAR; INTRAVENOUS
Status: DISCONTINUED | OUTPATIENT
Start: 2024-12-23 | End: 2024-12-24 | Stop reason: HOSPADM

## 2024-12-23 RX ADMIN — ACETAMINOPHEN 650 MG: 325 TABLET ORAL at 17:45

## 2024-12-23 NOTE — NON STRESS TEST
Jeanna La, a  at 37w5d with an JOSE of 2025, Date entered prior to episode creation, was seen at [facility] LABOR DELIVERY for a nonstress test.    Chief Complaint   Patient presents with    Hypertension-Pregnant       Patient Active Problem List   Diagnosis    Amniotic fluid leaking    Hypertension affecting pregnancy       Start Time: 1155  Stop Time: 1230    Interpretation A  Nonstress Test Interpretation A: Reactive  Comments A: MB

## 2024-12-23 NOTE — H&P
MANOHAR Sage  Obstetric History and Physical    Chief Complaint   Patient presents with    Hypertension-Pregnant       Subjective     Patient is a 24 y.o. female  currently at 37w5d, who presents with IUGR and GHTN for IOL.    Her prenatal care is complicated by  hypertension  gestational hypertension.  Her previous obstetric/gynecological history is noted for is non-contributory.    The following portions of the patient's history were reviewed and updated as appropriate: current medications, allergies, past medical history, past surgical history, past family history, past social history, and problem list .   Social History     Socioeconomic History    Marital status: Single   Tobacco Use    Smoking status: Never    Smokeless tobacco: Never   Vaping Use    Vaping status: Never Used   Substance and Sexual Activity    Alcohol use: Never    Drug use: Never    Sexual activity: Yes     Past Medical History:   Diagnosis Date    Diabetes mellitus     TYPE II ON INSULIN       Current Facility-Administered Medications:     acetaminophen (TYLENOL) tablet 650 mg, 650 mg, Oral, Q4H PRN, Carolina Khan, , 650 mg at 24 1745    butorphanol (STADOL) injection 1 mg, 1 mg, Intravenous, Q2H PRN, Carolina Khan DO    butorphanol (STADOL) injection 2 mg, 2 mg, Intravenous, Q3H PRN, Carolina Khan DO    dextrose (D50W) (25 g/50 mL) IV injection 25 g, 25 g, Intravenous, Q15 Min PRN, Carolina Khan DO    dextrose (GLUTOSE) oral gel 15 g, 15 g, Oral, Q15 Min PRN, Carolina Khan,     glucagon HCl (Diagnostic) injection 1 mg, 1 mg, Intramuscular, Q15 Min PRN, Carolina Khan DO    insulin glargine (LANTUS, SEMGLEE) injection 28 Units, 28 Units, Subcutaneous, BID, Carolina Khan DO    Insulin Lispro (humaLOG) injection 2-9 Units, 2-9 Units, Subcutaneous, 4x Daily AC & at Bedtime, Carolina Khan DO    lactated ringers bolus 1,000 mL, 1,000 mL,  Intravenous, Once PRN, Carolina Khan DO    lactated ringers infusion, 125 mL/hr, Intravenous, Continuous, Carolina Khan DO    miSOPROStol (CYTOTEC) split tablet 25 mcg, 25 mcg, Vaginal, Q4H, Carolina Khan DO    ondansetron ODT (ZOFRAN-ODT) disintegrating tablet 4 mg, 4 mg, Oral, Q6H PRN **OR** ondansetron (ZOFRAN) injection 4 mg, 4 mg, Intravenous, Q6H PRN, Carolina Khan DO    [START ON 12/24/2024] prenatal vitamin tablet 1 tablet, 1 tablet, Oral, Daily, Carolina Khan DO    sodium chloride (NS) irrigation solution 1,000 mL, 1,000 mL, Irrigation, Once PRN, Carolina Khan,     sodium chloride 0.9 % infusion 40 mL, 40 mL, Intravenous, PRN, Carolina Khan DO    terbutaline (BRETHINE) injection 0.2 mg, 0.2 mg, Subcutaneous, PRN, Carolina Khan,   No Known Allergies  Past Surgical History:   Procedure Laterality Date    NO PAST SURGERIES           Prenatal Information:   Maternal Prenatal Labs  Blood Type ABO Type   Date Value Ref Range Status   12/23/2024 O  Final      Rh Status RH type   Date Value Ref Range Status   12/23/2024 Positive  Final      Antibody Screen Antibody Screen   Date Value Ref Range Status   12/23/2024 Negative  Final      Rapid Urine Drug Screen Barbiturates Screen, Urine   Date Value Ref Range Status   12/23/2024 Negative Negative Final     Benzodiazepine Screen, Urine   Date Value Ref Range Status   12/23/2024 Negative Negative Final     Methadone Screen, Urine   Date Value Ref Range Status   12/23/2024 Negative Negative Final     Opiate Screen   Date Value Ref Range Status   12/23/2024 Negative Negative Final     THC, Screen, Urine   Date Value Ref Range Status   12/23/2024 Negative Negative Final     Cocaine Screen, Urine   Date Value Ref Range Status   12/23/2024 Negative Negative Final     Amphetamine Screen, Urine   Date Value Ref Range Status   12/23/2024 Negative Negative Final     Buprenorphine,  "Screen, Urine   Date Value Ref Range Status   12/23/2024 Negative Negative Final     Methamphetamine, Ur   Date Value Ref Range Status   12/23/2024 Negative Negative Final     Oxycodone Screen, Urine   Date Value Ref Range Status   12/23/2024 Negative Negative Final     Tricyclic Antidepressants Screen   Date Value Ref Range Status   12/23/2024 Negative Negative Final      Group B Strep Culture No results found for: \"GBSANTIGEN\"           External Prenatal Results       Pregnancy Outside Results - Transcribed From Office Records - See Scanned Records For Details       Test Value Date Time    ABO  O  12/23/24 1542    Rh  Positive  12/23/24 1542    Antibody Screen  Negative  12/23/24 1401      ^ Negative  05/06/24     Varicella IgG       Rubella       Hgb  11.5 g/dL 12/23/24 1400      ^ 10.9 g/dL 10/25/24 1121    Hct  34.2 % 12/23/24 1400      ^ 32.9 % 10/25/24 1121    HgB A1c        1h GTT       3h GTT Fasting       3h GTT 1 hour       3h GTT 2 hour       3h GTT 3 hour        Gonorrhea (discrete) ^ Negative  12/11/24 1501    Chlamydia (discrete) ^ Negative  12/11/24 1501    RPR ^ Nonreactive  10/25/24 1121      ^ Non-Reactive  05/06/24     Syphils cascade: TP-Ab (FTA)       TP-Ab       TP-Ab (EIA)       TPPA       HBsAg ^ Negative  05/06/24     Herpes Simplex Virus PCR       Herpes Simplex VIrus Culture       HIV ^ Negative  05/06/24     Hep C RNA Quant PCR       Hep C Antibody ^ NEGATIVE  05/06/24     AFP       NIPT       Cystic Fibrosis ([place]is)       Cystic Fibroisis        Spinal Muscular atrophy       Fragile X       Group B Strep ^ Negative  12/11/24     GBS Susceptibility to Clindamycin       GBS Susceptibility to Erythromycin       Fetal Fibronectin       Genetic Testing, Maternal Blood                 Drug Screening       Test Value Date Time    Urine Drug Screen       Amphetamine Screen  Negative  12/23/24 1329    Barbiturate Screen  Negative  12/23/24 1329    Benzodiazepine Screen  Negative  12/23/24 1329 "    Methadone Screen  Negative  24 1329    Phencyclidine Screen  Negative  24 1329    Opiates Screen  Negative  24 1329    THC Screen  Negative  24 1329    Cocaine Screen       Propoxyphene Screen       Buprenorphine Screen  Negative  24 1329    Methamphetamine Screen       Oxycodone Screen  Negative  24 1329    Tricyclic Antidepressants Screen  Negative  24 1329              Legend    ^: Historical                              Past OB History:     OB History    Para Term  AB Living   1 0 0 0 0 0   SAB IAB Ectopic Molar Multiple Live Births   0 0 0 0 0 0      # Outcome Date GA Lbr Stephen/2nd Weight Sex Type Anes PTL Lv   1 Current                Past Medical History: Past Medical History:   Diagnosis Date    Diabetes mellitus     TYPE II ON INSULIN      Past Surgical History Past Surgical History:   Procedure Laterality Date    NO PAST SURGERIES        Family History: Family History   Problem Relation Age of Onset    Diabetes Father       Social History:  reports that she has never smoked. She has never used smokeless tobacco.   reports no history of alcohol use.   reports no history of drug use.        Review of Systems-all negative except as noted in HPI      Objective     Vital Signs Range for the last 24 hours  Temperature: Temp:  [98.6 °F (37 °C)] 98.6 °F (37 °C)   Temp Source: Temp src: Oral   BP: BP: (121-154)/(66-93) 152/85   Pulse: Heart Rate:  [] 104   Respirations: Resp:  [18] 18   Weight: Weight:  [93 kg (205 lb)] 93 kg (205 lb)     Physical Examination: General appearance - alert, well appearing, and in no distress, oriented to person, place, and time, normal appearing weight and well hydrated  Mental status - alert, oriented to person, place, and time, normal mood, behavior, speech, dress, motor activity, and thought processes, affect appropriate to mood  Neck - supple, no significant adenopathy  Chest - clear to auscultation, no wheezes, rales  or rhonchi, symmetric air entry, no tachypnea, retractions or cyanosis  Heart - normal rate, regular rhythm, normal S1, S2, no murmurs, rubs, clicks or gallops  Abdomen - soft, nontender, gravid uterus, no masses or organomegaly  no rebound tenderness noted,   Pelvic - normal external genitalia, vulva, vagina, cervix, uterus and adnexa  Neurological - alert, oriented, normal speech, no focal findings or movement disorder noted  Musculoskeletal - no joint tenderness, deformity or swelling  Extremities - peripheral pulses normal, no pedal edema, no clubbing or cyanosis  Skin - normal coloration and turgor, no rashes, no suspicious skin lesions noted        Cervix: Exam by:     Dilation:     Effacement:     Station:       Laboratory Results:   Lab Results (last 24 hours)       Procedure Component Value Units Date/Time    Group B Streptococcus Culture - Swab, Vaginal/Rectum [[id number]] Resulted: 12/11/24    Specimen: Swab from Vaginal/Rectum Updated: 12/23/24 1451     External Strep Group B Ag Negative    Fentanyl, Urine - Urine, Clean Catch [[id number]]  (Normal) Collected: 12/23/24 1329    Specimen: Urine, Clean Catch Updated: 12/23/24 1443     Fentanyl, Urine Negative    Narrative:      Negative Threshold:      Fentanyl 5 ng/mL     The normal value for the drug tested is negative. This report includes final unconfirmed screening results to be used for medical treatment purposes only. Unconfirmed results must not be used for non-medical purposes such as employment or legal testing. Clinical consideration should be applied to any drug of abuse test, particularly when unconfirmed results are used.           Urine Drug Screen - Urine, Clean Catch [[id number]]  (Normal) Collected: 12/23/24 1329    Specimen: Urine, Clean Catch Updated: 12/23/24 1435     THC, Screen, Urine Negative     Phencyclidine (PCP), Urine Negative     Cocaine Screen, Urine Negative     Methamphetamine, Ur Negative     Opiate Screen Negative      Amphetamine Screen, Urine Negative     Benzodiazepine Screen, Urine Negative     Tricyclic Antidepressants Screen Negative     Methadone Screen, Urine Negative     Barbiturates Screen, Urine Negative     Oxycodone Screen, Urine Negative     Buprenorphine, Screen, Urine Negative    Narrative:      Cutoff For Drugs Screened:    Amphetamines               500 ng/ml  Barbiturates               200 ng/ml  Benzodiazepines            150 ng/ml  Cocaine                    150 ng/ml  Methadone                  200 ng/ml  Opiates                    100 ng/ml  Phencyclidine               25 ng/ml  THC                         50 ng/ml  Methamphetamine            500 ng/ml  Tricyclic Antidepressants  300 ng/ml  Oxycodone                  100 ng/ml  Buprenorphine               10 ng/ml    The normal value for all drugs tested is negative. This report includes unconfirmed screening results, with the cutoff values listed, to be used for medical treatment purposes only.  Unconfirmed results must not be used for non-medical purposes such as employment or legal testing.  Clinical consideration should be applied to any drug of abuse test, particularly when unconfirmed results are used.      CBC & Differential [[id number]]  (Abnormal) Collected: 12/23/24 1400    Specimen: Blood Updated: 12/23/24 1422    Narrative:      The following orders were created for panel order CBC & Differential.  Procedure                               Abnormality         Status                     ---------                               -----------         ------                     CBC Auto Differential[[id number]]        Abnormal            Final result                 Please view results for these tests on the individual orders.    CBC Auto Differential [[id number]]  (Abnormal) Collected: 12/23/24 1400    Specimen: Blood Updated: 12/23/24 1422     WBC 9.33 10*3/mm3      RBC 4.02 10*6/mm3      Hemoglobin 11.5 g/dL      Hematocrit 34.2 %      MCV 85.1 fL       "MCH 28.6 pg      MCHC 33.6 g/dL      RDW 14.1 %      RDW-SD 43.6 fl      MPV 10.5 fL      Platelets 236 10*3/mm3      Neutrophil % 68.4 %      Lymphocyte % 22.7 %      Monocyte % 7.8 %      Eosinophil % 0.3 %      Basophil % 0.4 %      Immature Grans % 0.4 %      Neutrophils, Absolute 6.37 10*3/mm3      Lymphocytes, Absolute 2.12 10*3/mm3      Monocytes, Absolute 0.73 10*3/mm3      Eosinophils, Absolute 0.03 10*3/mm3      Basophils, Absolute 0.04 10*3/mm3      Immature Grans, Absolute 0.04 10*3/mm3      nRBC 0.0 /100 WBC     Treponema pallidum AB w/Reflex RPR [[id number]] Collected: 12/23/24 1400    Specimen: Blood Updated: 12/23/24 1418          Radiology Review:   Imaging Results (Last 72 Hours)       ** No results found for the last 72 hours. **              Assessment & Plan       Hypertension affecting pregnancy      Assessment & Plan    Assessment:  1.  Intrauterine pregnancy at 37w5d weeks gestation with reassuring fetal status.    2.  induction of labor  for IUGR, GHTN  with unfavorable cervix  3.  Obstetrical history significant for is noncontributory.  4.  GBS status: No results found for: \"GBSANTIGEN\"    Plan:  1. fetal and uterine monitoring  continuously and cervical ripening with  Misoprostol  2. Plan of care has been reviewed with patient   3.  Risks, benefits of treatment plan have been discussed.  4.  All questions have been answered.        Carolina Khan, DO  12/23/2024  17:50 EST    "

## 2024-12-24 ENCOUNTER — ANESTHESIA (OUTPATIENT)
Dept: LABOR AND DELIVERY | Facility: HOSPITAL | Age: 24
End: 2024-12-24
Payer: COMMERCIAL

## 2024-12-24 ENCOUNTER — ANESTHESIA EVENT (OUTPATIENT)
Dept: LABOR AND DELIVERY | Facility: HOSPITAL | Age: 24
End: 2024-12-24
Payer: COMMERCIAL

## 2024-12-24 PROBLEM — Z33.1 IUP (INTRAUTERINE PREGNANCY), INCIDENTAL: Status: ACTIVE | Noted: 2024-12-24

## 2024-12-24 LAB
GLUCOSE BLDC GLUCOMTR-MCNC: 97 MG/DL (ref 70–130)
TREPONEMA PALLIDUM IGG+IGM AB [PRESENCE] IN SERUM OR PLASMA BY IMMUNOASSAY: NORMAL

## 2024-12-24 PROCEDURE — 82948 REAGENT STRIP/BLOOD GLUCOSE: CPT

## 2024-12-24 PROCEDURE — 25810000003 LACTATED RINGERS PER 1000 ML: Performed by: OBSTETRICS & GYNECOLOGY

## 2024-12-24 PROCEDURE — 25010000002 CEFAZOLIN PER 500 MG: Performed by: OBSTETRICS & GYNECOLOGY

## 2024-12-24 PROCEDURE — 25010000002 ONDANSETRON PER 1 MG: Performed by: OBSTETRICS & GYNECOLOGY

## 2024-12-24 PROCEDURE — 25010000002 METOCLOPRAMIDE PER 10 MG: Performed by: OBSTETRICS & GYNECOLOGY

## 2024-12-24 PROCEDURE — 25010000002 BUTORPHANOL PER 1 MG: Performed by: OBSTETRICS & GYNECOLOGY

## 2024-12-24 PROCEDURE — 25810000003 LACTATED RINGERS PER 1000 ML: Performed by: ANESTHESIOLOGY

## 2024-12-24 PROCEDURE — 25010000002 MORPHINE PER 10 MG: Performed by: ANESTHESIOLOGY

## 2024-12-24 PROCEDURE — 25010000002 KETOROLAC TROMETHAMINE PER 15 MG: Performed by: OBSTETRICS & GYNECOLOGY

## 2024-12-24 PROCEDURE — 63710000001 INSULIN GLARGINE PER 5 UNITS: Performed by: OBSTETRICS & GYNECOLOGY

## 2024-12-24 PROCEDURE — 25010000002 BUPIVACAINE PF 0.75 % SOLUTION: Performed by: ANESTHESIOLOGY

## 2024-12-24 PROCEDURE — 25010000002 FENTANYL CITRATE (PF) 50 MCG/ML SOLUTION: Performed by: ANESTHESIOLOGY

## 2024-12-24 DEVICE — SEAL HEMO SURG ARISTA/AH ABS/PWDR 3GM: Type: IMPLANTABLE DEVICE | Site: UTERUS | Status: FUNCTIONAL

## 2024-12-24 RX ORDER — BUPIVACAINE HYDROCHLORIDE 7.5 MG/ML
INJECTION, SOLUTION EPIDURAL; RETROBULBAR
Status: COMPLETED | OUTPATIENT
Start: 2024-12-24 | End: 2024-12-24

## 2024-12-24 RX ORDER — ONDANSETRON 2 MG/ML
4 INJECTION INTRAMUSCULAR; INTRAVENOUS ONCE AS NEEDED
Status: DISCONTINUED | OUTPATIENT
Start: 2024-12-24 | End: 2024-12-27 | Stop reason: HOSPADM

## 2024-12-24 RX ORDER — ACETAMINOPHEN 325 MG/1
650 TABLET ORAL EVERY 4 HOURS PRN
Status: DISCONTINUED | OUTPATIENT
Start: 2024-12-24 | End: 2024-12-27 | Stop reason: HOSPADM

## 2024-12-24 RX ORDER — ACETAMINOPHEN 500 MG
1000 TABLET ORAL ONCE
Status: DISCONTINUED | OUTPATIENT
Start: 2024-12-24 | End: 2024-12-24 | Stop reason: HOSPADM

## 2024-12-24 RX ORDER — MISOPROSTOL 100 MCG
25 TABLET ORAL EVERY 4 HOURS PRN
Status: DISCONTINUED | OUTPATIENT
Start: 2024-12-24 | End: 2024-12-24

## 2024-12-24 RX ORDER — OXYTOCIN/0.9 % SODIUM CHLORIDE 30/500 ML
250 PLASTIC BAG, INJECTION (ML) INTRAVENOUS CONTINUOUS
Status: ACTIVE | OUTPATIENT
Start: 2024-12-24 | End: 2024-12-24

## 2024-12-24 RX ORDER — NALOXONE HCL 0.4 MG/ML
0.1 VIAL (ML) INJECTION
Status: DISCONTINUED | OUTPATIENT
Start: 2024-12-24 | End: 2024-12-24 | Stop reason: SDUPTHER

## 2024-12-24 RX ORDER — MISOPROSTOL 100 UG/1
600 TABLET ORAL EVERY 4 HOURS PRN
Status: DISCONTINUED | OUTPATIENT
Start: 2024-12-24 | End: 2024-12-24

## 2024-12-24 RX ORDER — PRENATAL VIT/IRON FUM/FOLIC AC 27MG-0.8MG
1 TABLET ORAL DAILY
Status: DISCONTINUED | OUTPATIENT
Start: 2024-12-25 | End: 2024-12-27 | Stop reason: HOSPADM

## 2024-12-24 RX ORDER — SODIUM CHLORIDE 9 MG/ML
40 INJECTION, SOLUTION INTRAVENOUS AS NEEDED
Status: DISCONTINUED | OUTPATIENT
Start: 2024-12-24 | End: 2024-12-27 | Stop reason: HOSPADM

## 2024-12-24 RX ORDER — DIPHENHYDRAMINE HCL 25 MG
25 CAPSULE ORAL EVERY 4 HOURS PRN
Status: DISCONTINUED | OUTPATIENT
Start: 2024-12-24 | End: 2024-12-24 | Stop reason: SDUPTHER

## 2024-12-24 RX ORDER — FENTANYL CITRATE 50 UG/ML
INJECTION, SOLUTION INTRAMUSCULAR; INTRAVENOUS AS NEEDED
Status: DISCONTINUED | OUTPATIENT
Start: 2024-12-24 | End: 2024-12-24 | Stop reason: SURG

## 2024-12-24 RX ORDER — DIPHENHYDRAMINE HYDROCHLORIDE 50 MG/ML
25 INJECTION INTRAMUSCULAR; INTRAVENOUS EVERY 4 HOURS PRN
Status: DISCONTINUED | OUTPATIENT
Start: 2024-12-24 | End: 2024-12-24 | Stop reason: SDUPTHER

## 2024-12-24 RX ORDER — IBUPROFEN 600 MG/1
600 TABLET, FILM COATED ORAL EVERY 6 HOURS
Status: DISCONTINUED | OUTPATIENT
Start: 2024-12-26 | End: 2024-12-27 | Stop reason: HOSPADM

## 2024-12-24 RX ORDER — OXYTOCIN/0.9 % SODIUM CHLORIDE 30/500 ML
999 PLASTIC BAG, INJECTION (ML) INTRAVENOUS ONCE
Status: DISCONTINUED | OUTPATIENT
Start: 2024-12-24 | End: 2024-12-27 | Stop reason: HOSPADM

## 2024-12-24 RX ORDER — MORPHINE SULFATE 0.5 MG/ML
INJECTION, SOLUTION EPIDURAL; INTRATHECAL; INTRAVENOUS AS NEEDED
Status: DISCONTINUED | OUTPATIENT
Start: 2024-12-24 | End: 2024-12-24 | Stop reason: SURG

## 2024-12-24 RX ORDER — HYDROXYZINE HYDROCHLORIDE 25 MG/1
50 TABLET, FILM COATED ORAL NIGHTLY PRN
Status: DISCONTINUED | OUTPATIENT
Start: 2024-12-24 | End: 2024-12-24 | Stop reason: HOSPADM

## 2024-12-24 RX ORDER — IBUPROFEN 800 MG/1
800 TABLET, FILM COATED ORAL EVERY 8 HOURS SCHEDULED
Status: DISCONTINUED | OUTPATIENT
Start: 2024-12-24 | End: 2024-12-24

## 2024-12-24 RX ORDER — MORPHINE SULFATE 2 MG/ML
2 INJECTION, SOLUTION INTRAMUSCULAR; INTRAVENOUS
Status: DISCONTINUED | OUTPATIENT
Start: 2024-12-24 | End: 2024-12-24 | Stop reason: SDUPTHER

## 2024-12-24 RX ORDER — CALCIUM CARBONATE 500 MG/1
1 TABLET, CHEWABLE ORAL EVERY 4 HOURS PRN
Status: DISCONTINUED | OUTPATIENT
Start: 2024-12-24 | End: 2024-12-27 | Stop reason: HOSPADM

## 2024-12-24 RX ORDER — ONDANSETRON 4 MG/1
4 TABLET, ORALLY DISINTEGRATING ORAL EVERY 6 HOURS PRN
Status: DISCONTINUED | OUTPATIENT
Start: 2024-12-24 | End: 2024-12-27 | Stop reason: HOSPADM

## 2024-12-24 RX ORDER — OXYTOCIN/0.9 % SODIUM CHLORIDE 30/500 ML
2-20 PLASTIC BAG, INJECTION (ML) INTRAVENOUS
Status: DISCONTINUED | OUTPATIENT
Start: 2024-12-24 | End: 2024-12-27 | Stop reason: HOSPADM

## 2024-12-24 RX ORDER — DIPHENHYDRAMINE HYDROCHLORIDE 50 MG/ML
25 INJECTION INTRAMUSCULAR; INTRAVENOUS EVERY 4 HOURS PRN
Status: DISCONTINUED | OUTPATIENT
Start: 2024-12-24 | End: 2024-12-27 | Stop reason: HOSPADM

## 2024-12-24 RX ORDER — HYDROCODONE BITARTRATE AND ACETAMINOPHEN 5; 325 MG/1; MG/1
1 TABLET ORAL EVERY 4 HOURS PRN
Status: DISCONTINUED | OUTPATIENT
Start: 2024-12-24 | End: 2024-12-27 | Stop reason: HOSPADM

## 2024-12-24 RX ORDER — ACETAMINOPHEN 500 MG
1000 TABLET ORAL EVERY 6 HOURS
Status: DISPENSED | OUTPATIENT
Start: 2024-12-24 | End: 2024-12-25

## 2024-12-24 RX ORDER — MORPHINE SULFATE 2 MG/ML
2 INJECTION, SOLUTION INTRAMUSCULAR; INTRAVENOUS
Status: DISCONTINUED | OUTPATIENT
Start: 2024-12-24 | End: 2024-12-27 | Stop reason: HOSPADM

## 2024-12-24 RX ORDER — PROMETHAZINE HYDROCHLORIDE 12.5 MG/1
12.5 SUPPOSITORY RECTAL EVERY 6 HOURS PRN
Status: DISCONTINUED | OUTPATIENT
Start: 2024-12-24 | End: 2024-12-24 | Stop reason: HOSPADM

## 2024-12-24 RX ORDER — METHYLERGONOVINE MALEATE 0.2 MG/ML
200 INJECTION INTRAVENOUS ONCE AS NEEDED
Status: DISCONTINUED | OUTPATIENT
Start: 2024-12-24 | End: 2024-12-27 | Stop reason: HOSPADM

## 2024-12-24 RX ORDER — ENOXAPARIN SODIUM 100 MG/ML
40 INJECTION SUBCUTANEOUS NIGHTLY
Status: DISCONTINUED | OUTPATIENT
Start: 2024-12-25 | End: 2024-12-27 | Stop reason: HOSPADM

## 2024-12-24 RX ORDER — DOCUSATE SODIUM 100 MG/1
100 CAPSULE, LIQUID FILLED ORAL 2 TIMES DAILY PRN
Status: DISCONTINUED | OUTPATIENT
Start: 2024-12-25 | End: 2024-12-27 | Stop reason: HOSPADM

## 2024-12-24 RX ORDER — SIMETHICONE 80 MG
80 TABLET,CHEWABLE ORAL 4 TIMES DAILY PRN
Status: DISCONTINUED | OUTPATIENT
Start: 2024-12-24 | End: 2024-12-27 | Stop reason: HOSPADM

## 2024-12-24 RX ORDER — METOCLOPRAMIDE HYDROCHLORIDE 5 MG/ML
10 INJECTION INTRAMUSCULAR; INTRAVENOUS EVERY 6 HOURS PRN
Status: DISCONTINUED | OUTPATIENT
Start: 2024-12-24 | End: 2024-12-24 | Stop reason: HOSPADM

## 2024-12-24 RX ORDER — METHYLERGONOVINE MALEATE 0.2 MG/ML
200 INJECTION INTRAVENOUS ONCE AS NEEDED
Status: DISCONTINUED | OUTPATIENT
Start: 2024-12-24 | End: 2024-12-24 | Stop reason: HOSPADM

## 2024-12-24 RX ORDER — METHYLERGONOVINE MALEATE 0.2 MG/ML
200 INJECTION INTRAVENOUS AS NEEDED
Status: DISCONTINUED | OUTPATIENT
Start: 2024-12-24 | End: 2024-12-24 | Stop reason: HOSPADM

## 2024-12-24 RX ORDER — DIPHENHYDRAMINE HCL 25 MG
25 CAPSULE ORAL EVERY 4 HOURS PRN
Status: DISCONTINUED | OUTPATIENT
Start: 2024-12-24 | End: 2024-12-27 | Stop reason: HOSPADM

## 2024-12-24 RX ORDER — TRANEXAMIC ACID 10 MG/ML
1000 INJECTION, SOLUTION INTRAVENOUS ONCE AS NEEDED
Status: DISCONTINUED | OUTPATIENT
Start: 2024-12-24 | End: 2024-12-24 | Stop reason: HOSPADM

## 2024-12-24 RX ORDER — OXYTOCIN/0.9 % SODIUM CHLORIDE 30/500 ML
125 PLASTIC BAG, INJECTION (ML) INTRAVENOUS ONCE AS NEEDED
Status: DISCONTINUED | OUTPATIENT
Start: 2024-12-24 | End: 2024-12-27 | Stop reason: HOSPADM

## 2024-12-24 RX ORDER — MISOPROSTOL 100 UG/1
25 TABLET ORAL EVERY 4 HOURS PRN
Status: DISCONTINUED | OUTPATIENT
Start: 2024-12-24 | End: 2024-12-24

## 2024-12-24 RX ORDER — OXYCODONE HYDROCHLORIDE 10 MG/1
10 TABLET ORAL EVERY 4 HOURS PRN
Status: DISCONTINUED | OUTPATIENT
Start: 2024-12-24 | End: 2024-12-27 | Stop reason: HOSPADM

## 2024-12-24 RX ORDER — PROMETHAZINE HYDROCHLORIDE 25 MG/1
12.5 TABLET ORAL EVERY 6 HOURS PRN
Status: DISCONTINUED | OUTPATIENT
Start: 2024-12-24 | End: 2024-12-24 | Stop reason: HOSPADM

## 2024-12-24 RX ORDER — ONDANSETRON 4 MG/1
4 TABLET, ORALLY DISINTEGRATING ORAL EVERY 6 HOURS PRN
Status: DISCONTINUED | OUTPATIENT
Start: 2024-12-24 | End: 2024-12-24 | Stop reason: HOSPADM

## 2024-12-24 RX ORDER — ACETAMINOPHEN 325 MG/1
650 TABLET ORAL EVERY 6 HOURS
Status: DISCONTINUED | OUTPATIENT
Start: 2024-12-25 | End: 2024-12-27 | Stop reason: HOSPADM

## 2024-12-24 RX ORDER — KETOROLAC TROMETHAMINE 30 MG/ML
15 INJECTION, SOLUTION INTRAMUSCULAR; INTRAVENOUS EVERY 6 HOURS
Status: DISPENSED | OUTPATIENT
Start: 2024-12-25 | End: 2024-12-26

## 2024-12-24 RX ORDER — HYDROXYZINE HYDROCHLORIDE 25 MG/1
50 TABLET, FILM COATED ORAL NIGHTLY PRN
Status: DISCONTINUED | OUTPATIENT
Start: 2024-12-24 | End: 2024-12-27 | Stop reason: HOSPADM

## 2024-12-24 RX ORDER — MAGNESIUM HYDROXIDE 1200 MG/15ML
3000 LIQUID ORAL ONCE AS NEEDED
Status: DISCONTINUED | OUTPATIENT
Start: 2024-12-24 | End: 2024-12-24 | Stop reason: HOSPADM

## 2024-12-24 RX ORDER — MISOPROSTOL 100 UG/1
600 TABLET ORAL AS NEEDED
Status: DISCONTINUED | OUTPATIENT
Start: 2024-12-24 | End: 2024-12-24 | Stop reason: HOSPADM

## 2024-12-24 RX ORDER — OXYCODONE HYDROCHLORIDE 5 MG/1
5 TABLET ORAL EVERY 4 HOURS PRN
Status: DISCONTINUED | OUTPATIENT
Start: 2024-12-24 | End: 2024-12-27 | Stop reason: HOSPADM

## 2024-12-24 RX ORDER — ONDANSETRON 2 MG/ML
4 INJECTION INTRAMUSCULAR; INTRAVENOUS EVERY 6 HOURS PRN
Status: DISCONTINUED | OUTPATIENT
Start: 2024-12-24 | End: 2024-12-24 | Stop reason: HOSPADM

## 2024-12-24 RX ORDER — SODIUM CHLORIDE, SODIUM LACTATE, POTASSIUM CHLORIDE, CALCIUM CHLORIDE 600; 310; 30; 20 MG/100ML; MG/100ML; MG/100ML; MG/100ML
INJECTION, SOLUTION INTRAVENOUS CONTINUOUS PRN
Status: DISCONTINUED | OUTPATIENT
Start: 2024-12-24 | End: 2024-12-24 | Stop reason: SURG

## 2024-12-24 RX ORDER — MISOPROSTOL 100 UG/1
600 TABLET ORAL ONCE AS NEEDED
Status: DISCONTINUED | OUTPATIENT
Start: 2024-12-24 | End: 2024-12-27 | Stop reason: HOSPADM

## 2024-12-24 RX ORDER — SODIUM CHLORIDE 0.9 % (FLUSH) 0.9 %
3-10 SYRINGE (ML) INJECTION AS NEEDED
Status: DISCONTINUED | OUTPATIENT
Start: 2024-12-24 | End: 2024-12-27 | Stop reason: HOSPADM

## 2024-12-24 RX ORDER — CARBOPROST TROMETHAMINE 250 UG/ML
250 INJECTION, SOLUTION INTRAMUSCULAR AS NEEDED
Status: DISCONTINUED | OUTPATIENT
Start: 2024-12-24 | End: 2024-12-24 | Stop reason: HOSPADM

## 2024-12-24 RX ORDER — NALOXONE HCL 0.4 MG/ML
0.1 VIAL (ML) INJECTION
Status: ACTIVE | OUTPATIENT
Start: 2024-12-24 | End: 2024-12-25

## 2024-12-24 RX ORDER — ONDANSETRON 2 MG/ML
4 INJECTION INTRAMUSCULAR; INTRAVENOUS EVERY 6 HOURS PRN
Status: DISCONTINUED | OUTPATIENT
Start: 2024-12-24 | End: 2024-12-27 | Stop reason: HOSPADM

## 2024-12-24 RX ORDER — CARBOPROST TROMETHAMINE 250 UG/ML
250 INJECTION, SOLUTION INTRAMUSCULAR EVERY 4 HOURS PRN
Status: DISCONTINUED | OUTPATIENT
Start: 2024-12-24 | End: 2024-12-27 | Stop reason: HOSPADM

## 2024-12-24 RX ORDER — ALUMINA, MAGNESIA, AND SIMETHICONE 2400; 2400; 240 MG/30ML; MG/30ML; MG/30ML
15 SUSPENSION ORAL EVERY 4 HOURS PRN
Status: DISCONTINUED | OUTPATIENT
Start: 2024-12-24 | End: 2024-12-27 | Stop reason: HOSPADM

## 2024-12-24 RX ORDER — KETOROLAC TROMETHAMINE 30 MG/ML
30 INJECTION, SOLUTION INTRAMUSCULAR; INTRAVENOUS ONCE
Status: COMPLETED | OUTPATIENT
Start: 2024-12-24 | End: 2024-12-24

## 2024-12-24 RX ORDER — ONDANSETRON 2 MG/ML
4 INJECTION INTRAMUSCULAR; INTRAVENOUS ONCE AS NEEDED
Status: DISCONTINUED | OUTPATIENT
Start: 2024-12-24 | End: 2024-12-24 | Stop reason: HOSPADM

## 2024-12-24 RX ORDER — FAMOTIDINE 10 MG/ML
20 INJECTION, SOLUTION INTRAVENOUS ONCE AS NEEDED
Status: DISCONTINUED | OUTPATIENT
Start: 2024-12-24 | End: 2024-12-24 | Stop reason: HOSPADM

## 2024-12-24 RX ADMIN — Medication 25 MCG: at 08:30

## 2024-12-24 RX ADMIN — SODIUM CHLORIDE, POTASSIUM CHLORIDE, SODIUM LACTATE AND CALCIUM CHLORIDE: 600; 310; 30; 20 INJECTION, SOLUTION INTRAVENOUS at 15:35

## 2024-12-24 RX ADMIN — Medication 25 MCG: at 04:25

## 2024-12-24 RX ADMIN — KETOROLAC TROMETHAMINE 30 MG: 30 INJECTION, SOLUTION INTRAMUSCULAR; INTRAVENOUS at 17:46

## 2024-12-24 RX ADMIN — MORPHINE SULFATE 4.7 MG: 0.5 INJECTION, SOLUTION EPIDURAL; INTRATHECAL; INTRAVENOUS at 16:19

## 2024-12-24 RX ADMIN — FENTANYL CITRATE 25 MCG: 50 INJECTION INTRAMUSCULAR; INTRAVENOUS at 15:42

## 2024-12-24 RX ADMIN — CEFAZOLIN 2 G: 2 INJECTION, POWDER, FOR SOLUTION INTRAMUSCULAR; INTRAVENOUS at 15:45

## 2024-12-24 RX ADMIN — FENTANYL CITRATE 50 MCG: 50 INJECTION, SOLUTION INTRAMUSCULAR; INTRAVENOUS at 16:12

## 2024-12-24 RX ADMIN — Medication 999 MILLI-UNITS/MIN: at 15:58

## 2024-12-24 RX ADMIN — BUTORPHANOL TARTRATE 2 MG: 2 INJECTION, SOLUTION INTRAMUSCULAR; INTRAVENOUS at 04:52

## 2024-12-24 RX ADMIN — BUPIVACAINE HYDROCHLORIDE 1.8 ML: 7.5 INJECTION, SOLUTION EPIDURAL; RETROBULBAR at 15:42

## 2024-12-24 RX ADMIN — FENTANYL CITRATE 25 MCG: 50 INJECTION, SOLUTION INTRAMUSCULAR; INTRAVENOUS at 16:11

## 2024-12-24 RX ADMIN — MISOPROSTOL 600 MCG: 100 TABLET ORAL at 16:20

## 2024-12-24 RX ADMIN — SODIUM CHLORIDE, SODIUM LACTATE, POTASSIUM CHLORIDE, CALCIUM CHLORIDE 125 ML/HR: 20; 30; 600; 310 INJECTION, SOLUTION INTRAVENOUS at 05:52

## 2024-12-24 RX ADMIN — SODIUM CHLORIDE, SODIUM LACTATE, POTASSIUM CHLORIDE, CALCIUM CHLORIDE 125 ML/HR: 20; 30; 600; 310 INJECTION, SOLUTION INTRAVENOUS at 22:36

## 2024-12-24 RX ADMIN — Medication 25 MCG: at 00:07

## 2024-12-24 RX ADMIN — METOCLOPRAMIDE 10 MG: 5 INJECTION, SOLUTION INTRAMUSCULAR; INTRAVENOUS at 17:48

## 2024-12-24 RX ADMIN — ONDANSETRON 4 MG: 2 INJECTION INTRAMUSCULAR; INTRAVENOUS at 04:52

## 2024-12-24 RX ADMIN — INSULIN GLARGINE 28 UNITS: 100 INJECTION, SOLUTION SUBCUTANEOUS at 09:00

## 2024-12-24 RX ADMIN — ACETAMINOPHEN 650 MG: 325 TABLET ORAL at 00:07

## 2024-12-24 RX ADMIN — ONDANSETRON 4 MG: 2 INJECTION INTRAMUSCULAR; INTRAVENOUS at 15:44

## 2024-12-24 RX ADMIN — MORPHINE SULFATE 0.3 MG: 0.5 INJECTION, SOLUTION EPIDURAL; INTRATHECAL; INTRAVENOUS at 15:42

## 2024-12-24 NOTE — ANESTHESIA PROCEDURE NOTES
Spinal Block    Pre-sedation assessment completed: 12/24/2024 3:40 PM    Patient reassessed immediately prior to procedure    Patient location during procedure: OB  Start Time: 12/24/2024 3:40 PM  Stop Time: 12/24/2024 3:42 PM  Performed By  Anesthesiologist: Luther Gordon MD  Preanesthetic Checklist  Completed: patient identified, IV checked, site marked, risks and benefits discussed, surgical consent, monitors and equipment checked, pre-op evaluation and timeout performed  Spinal Block Prep:  Sterile Tech:cap, gloves and sterile barriers  Patient Monitoring:EKG, continuous pulse oximetry and blood pressure monitoring    Spinal Block Procedure    Fluid Appearance:clear  Medications: bupivacaine PF (MARCAINE) injection 0.75% - Intrathecal   1.8 mL - 12/24/2024 3:42:00 PM   Post Assessment  Patient Tolerance:patient tolerated the procedure well with no apparent complications  Complications no

## 2024-12-24 NOTE — INTERVAL H&P NOTE
Failed IOL. Cervix no change. H&P reviewed.  The patient was examined and there are no changes to the H&P. Will proceed with primary  section.

## 2024-12-24 NOTE — H&P (VIEW-ONLY)
OB Progress Note      Hospital Course: G 1,  P 0. Patient was admitted on 12/23/2024 11:45 AM with IUP at 37w6d weeks gestation secondary to Hypertension affecting pregnancy [O16.9]  IUP (intrauterine pregnancy), incidental [Z33.1]. Cytotec x2.  Patient stable.     signs in last 24 hours:    Vital Signs Range for the last 24 hours              Temp:  [98 °F (36.7 °C)-98.6 °F (37 °C)] 98 °F (36.7 °C)  Heart Rate:  [] 82  Resp:  [18] 18  BP: (121-154)/(66-93) 132/89     Radiology     Imaging Results (Last 24 Hours)       ** No results found for the last 24 hours. **             Labs     Lab Results (last 24 hours)       Procedure Component Value Units Date/Time    POC Glucose Once [[id number]]  (Normal) Collected: 12/24/24 0546    Specimen: Blood Updated: 12/24/24 0551     Glucose 97 mg/dL     Treponema pallidum AB w/Reflex RPR [[id number]]  (Normal) Collected: 12/23/24 1400    Specimen: Blood Updated: 12/24/24 0201     Treponemal AB Total Non-Reactive    Narrative:      Reactive results will reflex RPR testing.    Group B Streptococcus Culture - Swab, Vaginal/Rectum [[id number]] Resulted: 12/11/24    Specimen: Swab from Vaginal/Rectum Updated: 12/23/24 1451     External Strep Group B Ag Negative    Fentanyl, Urine - Urine, Clean Catch [[id number]]  (Normal) Collected: 12/23/24 1329    Specimen: Urine, Clean Catch Updated: 12/23/24 1443     Fentanyl, Urine Negative    Narrative:      Negative Threshold:      Fentanyl 5 ng/mL     The normal value for the drug tested is negative. This report includes final unconfirmed screening results to be used for medical treatment purposes only. Unconfirmed results must not be used for non-medical purposes such as employment or legal testing. Clinical consideration should be applied to any drug of abuse test, particularly when unconfirmed results are used.           Urine Drug Screen - Urine, Clean Catch [[id number]]  (Normal) Collected: 12/23/24 1329    Specimen: Urine, Clean  Catch Updated: 12/23/24 1435     THC, Screen, Urine Negative     Phencyclidine (PCP), Urine Negative     Cocaine Screen, Urine Negative     Methamphetamine, Ur Negative     Opiate Screen Negative     Amphetamine Screen, Urine Negative     Benzodiazepine Screen, Urine Negative     Tricyclic Antidepressants Screen Negative     Methadone Screen, Urine Negative     Barbiturates Screen, Urine Negative     Oxycodone Screen, Urine Negative     Buprenorphine, Screen, Urine Negative    Narrative:      Cutoff For Drugs Screened:    Amphetamines               500 ng/ml  Barbiturates               200 ng/ml  Benzodiazepines            150 ng/ml  Cocaine                    150 ng/ml  Methadone                  200 ng/ml  Opiates                    100 ng/ml  Phencyclidine               25 ng/ml  THC                         50 ng/ml  Methamphetamine            500 ng/ml  Tricyclic Antidepressants  300 ng/ml  Oxycodone                  100 ng/ml  Buprenorphine               10 ng/ml    The normal value for all drugs tested is negative. This report includes unconfirmed screening results, with the cutoff values listed, to be used for medical treatment purposes only.  Unconfirmed results must not be used for non-medical purposes such as employment or legal testing.  Clinical consideration should be applied to any drug of abuse test, particularly when unconfirmed results are used.      CBC & Differential [[id number]]  (Abnormal) Collected: 12/23/24 1400    Specimen: Blood Updated: 12/23/24 1422    Narrative:      The following orders were created for panel order CBC & Differential.  Procedure                               Abnormality         Status                     ---------                               -----------         ------                     CBC Auto Differential[[id number]]        Abnormal            Final result                 Please view results for these tests on the individual orders.    CBC Auto Differential  [[id number]]  (Abnormal) Collected: 12/23/24 1400    Specimen: Blood Updated: 12/23/24 1422     WBC 9.33 10*3/mm3      RBC 4.02 10*6/mm3      Hemoglobin 11.5 g/dL      Hematocrit 34.2 %      MCV 85.1 fL      MCH 28.6 pg      MCHC 33.6 g/dL      RDW 14.1 %      RDW-SD 43.6 fl      MPV 10.5 fL      Platelets 236 10*3/mm3      Neutrophil % 68.4 %      Lymphocyte % 22.7 %      Monocyte % 7.8 %      Eosinophil % 0.3 %      Basophil % 0.4 %      Immature Grans % 0.4 %      Neutrophils, Absolute 6.37 10*3/mm3      Lymphocytes, Absolute 2.12 10*3/mm3      Monocytes, Absolute 0.73 10*3/mm3      Eosinophils, Absolute 0.03 10*3/mm3      Basophils, Absolute 0.04 10*3/mm3      Immature Grans, Absolute 0.04 10*3/mm3      nRBC 0.0 /100 WBC               Review of Systems    The following systems were reviewed and negative;  ENT, respiratory, cardiovascular, gastrointestinal, genitourinary, breast, endocrine and allergies / immunologic.      Physical Exam:      General Appearance:    Alert, cooperative, in no acute distress   Head:    Normocephalic, without obvious abnormality, atraumatic   Eyes:            Lids and lashes normal, conjunctivae and sclerae normal, no   icterus, no pallor, corneas clear, PERRLA   Ears:    Ears appear intact with no abnormalities noted   Throat:   No oral lesions, no thrush, oral mucosa moist   Neck:   No adenopathy, supple, trachea midline, no thyromegaly, no     carotid bruit, no JVD   Back:     No kyphosis present, no scoliosis present, no skin lesions,       erythema or scars, no tenderness to percussion or                   palpation,   range of motion normal   Lungs:     Clear to auscultation,respirations regular, even and                   unlabored    Heart:    Regular rhythm and normal rate, normal S1 and S2, no            murmur, no gallop, no rub, no click   Breast Exam:    Deferred   Abdomen:     Normal bowel sounds, no masses, no organomegaly, soft        non-tender, non-distended, no  guarding, no rebound                 tenderness   Genitalia:    Cervix: Dilated 1cm, 60cm   Extremities:   Moves all extremities well, no edema, no cyanosis, no              redness   Pulses:   Pulses palpable and equal bilaterally   Skin:   No bleeding, bruising or rash   Lymph nodes:   No palpable adenopathy   Neurologic:   Cranial nerves 2 - 12 grossly intact, sensation intact, DTR        present and equal bilaterally                 Assessment:  1.  GHTN. Type 2 Diabetic on Insulin. Cytotec x2.    Plan:  1. Will continue current plan of care. ANGIE Jones III, MD  12/24/24  08:51 EST

## 2024-12-24 NOTE — PROGRESS NOTES
OB Progress Note      Hospital Course: G 1,  P 0. Patient was admitted on 12/23/2024 11:45 AM with IUP at 37w6d weeks gestation secondary to Hypertension affecting pregnancy [O16.9]  IUP (intrauterine pregnancy), incidental [Z33.1]. Cytotec x2.  Patient stable.     signs in last 24 hours:    Vital Signs Range for the last 24 hours              Temp:  [98 °F (36.7 °C)-98.6 °F (37 °C)] 98 °F (36.7 °C)  Heart Rate:  [] 82  Resp:  [18] 18  BP: (121-154)/(66-93) 132/89     Radiology     Imaging Results (Last 24 Hours)       ** No results found for the last 24 hours. **             Labs     Lab Results (last 24 hours)       Procedure Component Value Units Date/Time    POC Glucose Once [[id number]]  (Normal) Collected: 12/24/24 0546    Specimen: Blood Updated: 12/24/24 0551     Glucose 97 mg/dL     Treponema pallidum AB w/Reflex RPR [[id number]]  (Normal) Collected: 12/23/24 1400    Specimen: Blood Updated: 12/24/24 0201     Treponemal AB Total Non-Reactive    Narrative:      Reactive results will reflex RPR testing.    Group B Streptococcus Culture - Swab, Vaginal/Rectum [[id number]] Resulted: 12/11/24    Specimen: Swab from Vaginal/Rectum Updated: 12/23/24 1451     External Strep Group B Ag Negative    Fentanyl, Urine - Urine, Clean Catch [[id number]]  (Normal) Collected: 12/23/24 1329    Specimen: Urine, Clean Catch Updated: 12/23/24 1443     Fentanyl, Urine Negative    Narrative:      Negative Threshold:      Fentanyl 5 ng/mL     The normal value for the drug tested is negative. This report includes final unconfirmed screening results to be used for medical treatment purposes only. Unconfirmed results must not be used for non-medical purposes such as employment or legal testing. Clinical consideration should be applied to any drug of abuse test, particularly when unconfirmed results are used.           Urine Drug Screen - Urine, Clean Catch [[id number]]  (Normal) Collected: 12/23/24 1329    Specimen: Urine, Clean  Catch Updated: 12/23/24 1435     THC, Screen, Urine Negative     Phencyclidine (PCP), Urine Negative     Cocaine Screen, Urine Negative     Methamphetamine, Ur Negative     Opiate Screen Negative     Amphetamine Screen, Urine Negative     Benzodiazepine Screen, Urine Negative     Tricyclic Antidepressants Screen Negative     Methadone Screen, Urine Negative     Barbiturates Screen, Urine Negative     Oxycodone Screen, Urine Negative     Buprenorphine, Screen, Urine Negative    Narrative:      Cutoff For Drugs Screened:    Amphetamines               500 ng/ml  Barbiturates               200 ng/ml  Benzodiazepines            150 ng/ml  Cocaine                    150 ng/ml  Methadone                  200 ng/ml  Opiates                    100 ng/ml  Phencyclidine               25 ng/ml  THC                         50 ng/ml  Methamphetamine            500 ng/ml  Tricyclic Antidepressants  300 ng/ml  Oxycodone                  100 ng/ml  Buprenorphine               10 ng/ml    The normal value for all drugs tested is negative. This report includes unconfirmed screening results, with the cutoff values listed, to be used for medical treatment purposes only.  Unconfirmed results must not be used for non-medical purposes such as employment or legal testing.  Clinical consideration should be applied to any drug of abuse test, particularly when unconfirmed results are used.      CBC & Differential [[id number]]  (Abnormal) Collected: 12/23/24 1400    Specimen: Blood Updated: 12/23/24 1422    Narrative:      The following orders were created for panel order CBC & Differential.  Procedure                               Abnormality         Status                     ---------                               -----------         ------                     CBC Auto Differential[[id number]]        Abnormal            Final result                 Please view results for these tests on the individual orders.    CBC Auto Differential  [[id number]]  (Abnormal) Collected: 12/23/24 1400    Specimen: Blood Updated: 12/23/24 1422     WBC 9.33 10*3/mm3      RBC 4.02 10*6/mm3      Hemoglobin 11.5 g/dL      Hematocrit 34.2 %      MCV 85.1 fL      MCH 28.6 pg      MCHC 33.6 g/dL      RDW 14.1 %      RDW-SD 43.6 fl      MPV 10.5 fL      Platelets 236 10*3/mm3      Neutrophil % 68.4 %      Lymphocyte % 22.7 %      Monocyte % 7.8 %      Eosinophil % 0.3 %      Basophil % 0.4 %      Immature Grans % 0.4 %      Neutrophils, Absolute 6.37 10*3/mm3      Lymphocytes, Absolute 2.12 10*3/mm3      Monocytes, Absolute 0.73 10*3/mm3      Eosinophils, Absolute 0.03 10*3/mm3      Basophils, Absolute 0.04 10*3/mm3      Immature Grans, Absolute 0.04 10*3/mm3      nRBC 0.0 /100 WBC               Review of Systems    The following systems were reviewed and negative;  ENT, respiratory, cardiovascular, gastrointestinal, genitourinary, breast, endocrine and allergies / immunologic.      Physical Exam:      General Appearance:    Alert, cooperative, in no acute distress   Head:    Normocephalic, without obvious abnormality, atraumatic   Eyes:            Lids and lashes normal, conjunctivae and sclerae normal, no   icterus, no pallor, corneas clear, PERRLA   Ears:    Ears appear intact with no abnormalities noted   Throat:   No oral lesions, no thrush, oral mucosa moist   Neck:   No adenopathy, supple, trachea midline, no thyromegaly, no     carotid bruit, no JVD   Back:     No kyphosis present, no scoliosis present, no skin lesions,       erythema or scars, no tenderness to percussion or                   palpation,   range of motion normal   Lungs:     Clear to auscultation,respirations regular, even and                   unlabored    Heart:    Regular rhythm and normal rate, normal S1 and S2, no            murmur, no gallop, no rub, no click   Breast Exam:    Deferred   Abdomen:     Normal bowel sounds, no masses, no organomegaly, soft        non-tender, non-distended, no  guarding, no rebound                 tenderness   Genitalia:    Cervix: Dilated 1cm, 60cm   Extremities:   Moves all extremities well, no edema, no cyanosis, no              redness   Pulses:   Pulses palpable and equal bilaterally   Skin:   No bleeding, bruising or rash   Lymph nodes:   No palpable adenopathy   Neurologic:   Cranial nerves 2 - 12 grossly intact, sensation intact, DTR        present and equal bilaterally                 Assessment:  1.  GHTN. Type 2 Diabetic on Insulin. Cytotec x2.    Plan:  1. Will continue current plan of care. ANGIE Jones III, MD  12/24/24  08:51 EST

## 2024-12-24 NOTE — OP NOTE
Primary Low Transverse  Section Operation Note    Jeanna La  2024  37w 6d    Surgeon: Dr. Jones  Pre-op Diagnosis:   GHTN. Failed IOL. Type 2 Diabetic: insulin    Post-op Diagnosis:     GHTN. Failed IOL. Type 2 Diabetic: insulin    Procedure(s):   SECTION PRIMARY     Surgeon(s):  Darren Jones III, MD    Anesthesia: Spinal    Staff: See Chart    Estimated Blood Loss: 500cc    Complications: None    Grafts and Implants: NA        Procedure     The patient was prepped and draped in the normal sterile fashion. A Pfannenstiel skin incision was made with the knife and carried down through the underlying fascia. The fascia was nicked in the midline and extended laterally. The peritoneum was entered. The bladder blade was placed. Bladder flaps were created. The uterine incision was made with the knife. The infant was delivered in atraumatic fashion. The nares and mouth was bulb suctioned. Cord was clamped times 2 and cut. The placenta was delivered intact. The uterus was closed with #1 chromic in a running locking fashion. The fascia was closed with 0 Dexon. The skin was closed with 4-0 Monocryl. The patient tolerated the procedure well and went to the recovery room in stable condition.       APGARS  8 & 9    GENDER  Female        Darren Jones III, MD     Date: 2024  Time: 16:20 EST

## 2024-12-24 NOTE — ANESTHESIA PREPROCEDURE EVALUATION
Anesthesia Evaluation     Patient summary reviewed and Nursing notes reviewed   no history of anesthetic complications:   NPO Solid Status: > 8 hours  NPO Liquid Status: > 8 hours           Airway   Mallampati: II  TM distance: >3 FB  Neck ROM: full  No difficulty expected  Dental - normal exam     Pulmonary - negative pulmonary ROS and normal exam    breath sounds clear to auscultation  Cardiovascular - normal exam    Rhythm: regular  Rate: normal    (+) hypertension      Neuro/Psych- negative ROS  GI/Hepatic/Renal/Endo    (+) diabetes mellitus type 2 using insulin    Musculoskeletal (-) negative ROS    Abdominal  - normal exam   Substance History - negative use     OB/GYN    (+) Pregnant        Other - negative ROS                     Anesthesia Plan    ASA 2     ITN and spinal       Anesthetic plan, risks, benefits, and alternatives have been provided, discussed and informed consent has been obtained with: patient.    Use of blood products discussed with patient  Consented to blood products.        CODE STATUS:    Level Of Support Discussed With: Patient  Code Status (Patient has no pulse and is not breathing): CPR (Attempt to Resuscitate)  Medical Interventions (Patient has pulse or is breathing): Full Support

## 2024-12-24 NOTE — NON STRESS TEST
Jeanna La, a  at 37w5d with an JOSE of 2025, Date entered prior to episode creation, was seen at [facility] for a nonstress test.    Chief Complaint   Patient presents with    Hypertension-Pregnant       Patient Active Problem List   Diagnosis    Amniotic fluid leaking    Hypertension affecting pregnancy       Start Time:   Stop Time:     Interpretation A  Nonstress Test Interpretation A: Reactive  Comments A: Verified by SERGIO Suh RN

## 2024-12-25 LAB
BASOPHILS # BLD AUTO: 0.03 10*3/MM3 (ref 0–0.2)
BASOPHILS NFR BLD AUTO: 0.3 % (ref 0–1.5)
DEPRECATED RDW RBC AUTO: 45.1 FL (ref 37–54)
EOSINOPHIL # BLD AUTO: 0 10*3/MM3 (ref 0–0.4)
EOSINOPHIL NFR BLD AUTO: 0 % (ref 0.3–6.2)
ERYTHROCYTE [DISTWIDTH] IN BLOOD BY AUTOMATED COUNT: 14.3 % (ref 12.3–15.4)
HCT VFR BLD AUTO: 28.2 % (ref 34–46.6)
HGB BLD-MCNC: 9.3 G/DL (ref 12–15.9)
IMM GRANULOCYTES # BLD AUTO: 0.06 10*3/MM3 (ref 0–0.05)
IMM GRANULOCYTES NFR BLD AUTO: 0.5 % (ref 0–0.5)
LYMPHOCYTES # BLD AUTO: 1.34 10*3/MM3 (ref 0.7–3.1)
LYMPHOCYTES NFR BLD AUTO: 11.8 % (ref 19.6–45.3)
MCH RBC QN AUTO: 28.8 PG (ref 26.6–33)
MCHC RBC AUTO-ENTMCNC: 33 G/DL (ref 31.5–35.7)
MCV RBC AUTO: 87.3 FL (ref 79–97)
MONOCYTES # BLD AUTO: 0.79 10*3/MM3 (ref 0.1–0.9)
MONOCYTES NFR BLD AUTO: 7 % (ref 5–12)
NEUTROPHILS NFR BLD AUTO: 80.4 % (ref 42.7–76)
NEUTROPHILS NFR BLD AUTO: 9.14 10*3/MM3 (ref 1.7–7)
NRBC BLD AUTO-RTO: 0 /100 WBC (ref 0–0.2)
PLATELET # BLD AUTO: 192 10*3/MM3 (ref 140–450)
PMV BLD AUTO: 10.4 FL (ref 6–12)
RBC # BLD AUTO: 3.23 10*6/MM3 (ref 3.77–5.28)
WBC NRBC COR # BLD AUTO: 11.36 10*3/MM3 (ref 3.4–10.8)

## 2024-12-25 PROCEDURE — 85025 COMPLETE CBC W/AUTO DIFF WBC: CPT | Performed by: OBSTETRICS & GYNECOLOGY

## 2024-12-25 PROCEDURE — 25010000002 ONDANSETRON PER 1 MG: Performed by: OBSTETRICS & GYNECOLOGY

## 2024-12-25 PROCEDURE — 25010000002 ENOXAPARIN PER 10 MG: Performed by: OBSTETRICS & GYNECOLOGY

## 2024-12-25 PROCEDURE — 25010000002 KETOROLAC TROMETHAMINE PER 15 MG: Performed by: OBSTETRICS & GYNECOLOGY

## 2024-12-25 RX ADMIN — ACETAMINOPHEN 1000 MG: 500 TABLET ORAL at 09:13

## 2024-12-25 RX ADMIN — ONDANSETRON 4 MG: 2 INJECTION INTRAMUSCULAR; INTRAVENOUS at 01:26

## 2024-12-25 RX ADMIN — KETOROLAC TROMETHAMINE 15 MG: 30 INJECTION, SOLUTION INTRAMUSCULAR; INTRAVENOUS at 09:13

## 2024-12-25 RX ADMIN — ENOXAPARIN SODIUM 40 MG: 40 INJECTION SUBCUTANEOUS at 21:16

## 2024-12-25 RX ADMIN — PRENATAL VITAMINS-IRON FUMARATE 27 MG IRON-FOLIC ACID 0.8 MG TABLET 1 TABLET: at 09:13

## 2024-12-25 RX ADMIN — ACETAMINOPHEN 650 MG: 325 TABLET ORAL at 21:17

## 2024-12-25 RX ADMIN — ACETAMINOPHEN 1000 MG: 500 TABLET ORAL at 16:15

## 2024-12-25 RX ADMIN — OXYCODONE 5 MG: 5 TABLET ORAL at 06:00

## 2024-12-25 RX ADMIN — SIMETHICONE 80 MG: 80 TABLET, CHEWABLE ORAL at 21:17

## 2024-12-25 NOTE — PROGRESS NOTES
Primary Low Transverse  Section Progress Note      Hospital Course: G 1, now P 1001. Patient was admitted on 2024 11:45 AM with IUP at 37w6d weeks gestation secondary to Hypertension affecting pregnancy [O16.9]  IUP (intrauterine pregnancy), incidental [Z33.1]. Patient underwent a primary low transverse  section.       Patient stable. No complaints.     signs in last 24 hours:    Vital Signs Range for the last 24 hours              Temp:  [98 °F (36.7 °C)-99.6 °F (37.6 °C)] 98 °F (36.7 °C)  Heart Rate:  [] 115  Resp:  [18-20] 18  BP: (113-138)/() 124/84     Radiology     Imaging Results (Last 24 Hours)       ** No results found for the last 24 hours. **             Labs     Lab Results (last 24 hours)       Procedure Component Value Units Date/Time    CBC & Differential [[id number]]  (Abnormal) Collected: 24    Specimen: Blood Updated: 24    Narrative:      The following orders were created for panel order CBC & Differential.  Procedure                               Abnormality         Status                     ---------                               -----------         ------                     CBC Auto Differential[[id number]]        Abnormal            Final result                 Please view results for these tests on the individual orders.    CBC Auto Differential [[id number]]  (Abnormal) Collected: 24    Specimen: Blood Updated: 24     WBC 11.36 10*3/mm3      RBC 3.23 10*6/mm3      Hemoglobin 9.3 g/dL      Hematocrit 28.2 %      MCV 87.3 fL      MCH 28.8 pg      MCHC 33.0 g/dL      RDW 14.3 %      RDW-SD 45.1 fl      MPV 10.4 fL      Platelets 192 10*3/mm3      Neutrophil % 80.4 %      Lymphocyte % 11.8 %      Monocyte % 7.0 %      Eosinophil % 0.0 %      Basophil % 0.3 %      Immature Grans % 0.5 %      Neutrophils, Absolute 9.14 10*3/mm3      Lymphocytes, Absolute 1.34 10*3/mm3      Monocytes, Absolute 0.79 10*3/mm3       Eosinophils, Absolute 0.00 10*3/mm3      Basophils, Absolute 0.03 10*3/mm3      Immature Grans, Absolute 0.06 10*3/mm3      nRBC 0.0 /100 WBC               Review of Systems    The following systems were reviewed and negative;  ENT, respiratory, cardiovascular, gastrointestinal, genitourinary, breast, endocrine and allergies / immunologic.      Physical Exam:      General Appearance:    Alert, cooperative, in no acute distress   Head:    Normocephalic, without obvious abnormality, atraumatic   Eyes:            Lids and lashes normal, conjunctivae and sclerae normal, no   icterus, no pallor, corneas clear, PERRLA   Ears:    Ears appear intact with no abnormalities noted   Throat:   No oral lesions, no thrush, oral mucosa moist   Neck:   No adenopathy, supple, trachea midline, no thyromegaly, no     carotid bruit, no JVD   Back:     No kyphosis present, no scoliosis present, no skin lesions,       erythema or scars, no tenderness to percussion or                   palpation,   range of motion normal   Lungs:     Clear to auscultation,respirations regular, even and                   unlabored    Heart:    Regular rhythm and normal rate, normal S1 and S2, no            murmur, no gallop, no rub, no click   Breast Exam:    Deferred   Abdomen:     Normal bowel sounds, no masses, no organomegaly, soft        non-tender, non-distended, no guarding, no rebound                 tenderness   Genitalia:    Deferred   Extremities:   Moves all extremities well, no edema, no cyanosis, no              redness   Pulses:   Pulses palpable and equal bilaterally   Skin:   No bleeding, bruising or rash   Lymph nodes:   No palpable adenopathy   Neurologic:   Cranial nerves 2 - 12 grossly intact, sensation intact, DTR        present and equal bilaterally                 Assessment:  1.  Primary Low Transverse  Section. POD#1. Patient doing well. No complaints.     Plan:  1. Will continue current plan of care and anticipate  discharge to home in the AM.      Darren Jones III, MD  12/25/24  11:47 EST

## 2024-12-25 NOTE — PLAN OF CARE
Problem: Adult Inpatient Plan of Care  Goal: Plan of Care Review  Outcome: Progressing  Flowsheets (Taken 12/25/2024 0334)  Progress: improving  Outcome Evaluation: vitals and bleeding wnl, fundus firm prevena over incision, clean and dry  Plan of Care Reviewed With: patient  Goal: Patient-Specific Goal (Individualized)  Outcome: Progressing  Goal: Absence of Hospital-Acquired Illness or Injury  Outcome: Progressing  Intervention: Identify and Manage Fall Risk  Description: Perform standard risk assessment on admission using a validated tool or comprehensive approach appropriate to the patient; reassess fall risk frequently, with change in status or transfer to another level of care.  Communicate risk to interprofessional healthcare team; ensure fall risk visible cue.  Determine need for increased observation, equipment and environmental modification, as well as use of supportive, nonskid footwear.  Adjust safety measures to individual needs and identified risk factors.  Reinforce the importance of active participation with fall risk prevention, safety, and physical activity with the patient and family.  Perform regular intentional rounding to assess need for position change, pain assessment and personal needs, including assistance with toileting.  Flowsheets (Taken 12/24/2024 2015)  Safety Promotion/Fall Prevention:   safety round/check completed   nonskid shoes/slippers when out of bed  Intervention: Prevent Infection  Description: Maintain skin and mucous membrane integrity; promote hand, oral and pulmonary hygiene.  Optimize fluid balance, nutrition, sleep and glycemic control to maximize infection resistance.  Identify potential sources of infection early to prevent or mitigate progression of infection (e.g., wound, lines, devices).  Evaluate ongoing need for invasive devices; remove promptly when no longer indicated.  Review vaccination status.  Flowsheets (Taken 12/24/2024 0725 by Christina Cabral,  RN)  Infection Prevention:   visitors restricted/screened   rest/sleep promoted  Goal: Optimal Comfort and Wellbeing  Outcome: Progressing  Intervention: Monitor Pain and Promote Comfort  Description: Assess pain level, treatment efficacy and patient response at regular intervals using a consistent pain scale.  Consider the presence and impact of preexisting chronic pain.  Encourage patient and caregiver involvement in pain assessment, interventions and safety measures.  Promote activity; balance with sleep and rest to enhance healing.  Flowsheets (Taken 12/24/2024 0725 by Christina Cabral, RN)  Pain Management Interventions:   pain management plan reviewed with patient/caregiver   pillow support provided   position adjusted  Intervention: Provide Person-Centered Care  Description: Use a family-focused approach to care; encourage support system presence and participation.  Develop trust and rapport by proactively providing information, encouraging questions, addressing concerns and offering reassurance.  Acknowledge emotional response to hospitalization.  Recognize and utilize personal coping strategies and strengths; develop goals via shared decision-making.  Honor spiritual and cultural preferences.  Flowsheets (Taken 12/24/2024 2015)  Trust Relationship/Rapport:   care explained   choices provided  Goal: Readiness for Transition of Care  Outcome: Progressing  Intervention: Mutually Develop Transition Plan  Description: Identify available resources for support (e.g., family, friends, community).  Identify and address barriers to ongoing treatment and home management (e.g., environmental, financial).  Provide opportunities to practice self-management skills.  Assess and monitor emotional readiness for transition.  Establish or reconnect linkage with outpatient providers or community-based services.  Flowsheets  Taken 12/25/2024 0334 by Christina Calderon RN  Equipment Currently Used at Home: none  Concerns  to be Addressed: no discharge needs identified  Readmission Within the Last 30 Days: no previous admission in last 30 days  Patient/Family Anticipates Transition to: home  Taken 2024 0602 by Dory Casillas RN  Transportation Anticipated: family or friend will provide     Problem: Postpartum ( Delivery)  Goal: Successful Parent Role Transition  Outcome: Progressing  Goal: Hemostasis  Outcome: Progressing  Goal: Effective Bowel Elimination  Outcome: Progressing  Goal: Fluid and Electrolyte Balance  Outcome: Progressing  Goal: Absence of Infection Signs and Symptoms  Outcome: Progressing  Goal: Anesthesia/Sedation Recovery  Outcome: Progressing  Intervention: Optimize Anesthesia Recovery  Description: Assess and monitor airway, breathing and circulation; maintain close surveillance for deterioration.  Implement continuous monitoring, such as cardiorespiratory, blood pressure, temperature, pulse oximetry and capnography.  Elevate head of bed, if able; facilitate regular position changes.  Assess neurocognitive function and for risks that may lead to postoperative delirium, such as decreased level of consciousness, pain and agitation; offer reassurance; answer questions.  Assess and monitor neurovascular and neuromuscular function, such as motor strength, tone, posture, peripheral pulses and extremity sensation; protect areas of decreased sensation from heat, cold, medical devices or objects.  Individualize frequency and intensity of monitoring based on sedation or anesthesia administered, identified risk factors, ongoing assessment and organizational protocol.  Prepare for administration of pharmacologic therapy, such as reversal agent, antiemetic or antipruritic medication, to manage sedation or anesthesia effects.  Adjust environment to maintain safety (e.g., fall precautions, safety equipment).  Flowsheets (Taken 2024)  Safety Promotion/Fall Prevention:   safety round/check completed    nonskid shoes/slippers when out of bed  Goal: Optimal Pain Control and Function  Outcome: Progressing  Intervention: Prevent or Manage Pain  Description: Set pain management goals; mutually determine pain management plan and review plan regularly.  Use a consistent, validated tool for pain assessment including function and quality of life; evaluate pain level, effect of treatment and patient's response at regular intervals.  Match pharmacologic analgesia to severity and type of pain mechanism; evaluate risk for opioid use and dependence; consider multimodal approach and titrate to patient response.  Provide around-the-clock dosing of pain medication to keep pain levels in control.  Manage medication-induced effects, such as constipation, nausea, pruritus, urinary retention, somnolence and dizziness.  Provide multimodal interventions, such as physical activity, therapeutic exercise, yoga, TENS (transcutaneous electrical nerve stimulation) and manual therapy; consider addition of complementary or alternative therapy.  Use cold application, as culturally-appropriate, to the incisional area for the first 24 to 48 hours to enhance comfort.  Encourage early ambulation, when able, to help avoid gas accumulation which can add to discomfort.  Verify correct infant latch when breastfeeding to prevent nipple pain.  If engorgement occurs, encourage more frequent breastfeeding or pumping and storing additional milk to ease discomfort. Cold compresses, as culturally-appropriate, may be used if bottle-feeding.  If postdural puncture headache identified, encourage adequate hydration and anticipate the need for epidural blood patch.  If hemorrhoids are present and painful, offer topical pain relief and sitz baths for comfort.  Consider and address emotional response to pain.  Modify pain perception by using techniques, such as distraction, mindfulness, guided imagery, meditation or music.  Flowsheets (Taken 12/24/2024 0725 by  Christina Cabral RN)  Pain Management Interventions:   pain management plan reviewed with patient/caregiver   pillow support provided   position adjusted  Goal: Nausea and Vomiting Relief  Outcome: Progressing  Goal: Effective Urinary Elimination  Outcome: Progressing  Intervention: Monitor and Manage Urinary Retention  Description: Monitor fluid balance to promote optimal hydration.  Advocate for early removal of indwelling urinary catheter.  Promote behavioral methods to enhance voiding ability, that may include relaxation techniques, mutually established regular elimination schedule, adequate time for bladder emptying, as well as positioning to optimize flow.  Implement methods to facilitate elimination (e.g., running water, warm water over perineum, sitz bath, privacy).  Utilize portable bladder ultrasound to assess pre- and postvoid volumes.  Anticipate the need for intermittent catheterization if other methods are unsuccessful and bladder ultrasound reveals large volume.  Facilitate urogenital hygiene to maintain perineal skin integrity.  Promote early mobilization to improve return of urinary tract function.  Flowsheets (Taken 12/25/2024 6833)  Urinary Elimination Promotion: catheter patency maintained  Goal: Effective Oxygenation and Ventilation  Outcome: Progressing   Goal Outcome Evaluation:

## 2024-12-25 NOTE — ANESTHESIA POSTPROCEDURE EVALUATION
Patient: Jeanna La    Procedure Summary       Date: 24 Room / Location:  [facility]   [facility]    Anesthesia Start: 153 Anesthesia Stop:     Procedure:  SECTION PRIMARY (Bilateral: Abdomen) Diagnosis: (failed induction)    Surgeons: Darren Jones III, MD Provider: Luther Gordon MD    Anesthesia Type: ITN, spinal ASA Status: 2            Anesthesia Type: ITN, spinal    Vitals  Vitals Value Taken Time   /76 24 1215   Temp 98 °F (36.7 °C) 24 0830   Pulse 96 24 1215   Resp 18 24 0830   SpO2 96 % 24 0830           Post Anesthesia Care and Evaluation    Patient location during evaluation: bedside  Patient participation: complete - patient participated  Level of consciousness: awake  Pain score: 2  Pain management: adequate    Airway patency: patent  Anesthetic complications: No anesthetic complications  PONV Status: controlled  Cardiovascular status: acceptable and blood pressure returned to baseline  Respiratory status: acceptable and room air  Hydration status: acceptable  Post Neuraxial Block status: Motor and sensory function returned to baseline and No signs or symptoms of PDPHNo anesthesia care post op

## 2024-12-25 NOTE — PLAN OF CARE
Problem: Adult Inpatient Plan of Care  Goal: Plan of Care Review  Outcome: Progressing     Problem: Postpartum ( Delivery)  Goal: Absence of Infection Signs and Symptoms  Outcome: Progressing   Goal Outcome Evaluation:

## 2024-12-26 PROCEDURE — 94761 N-INVAS EAR/PLS OXIMETRY MLT: CPT

## 2024-12-26 PROCEDURE — 94799 UNLISTED PULMONARY SVC/PX: CPT

## 2024-12-26 RX ADMIN — PRENATAL VITAMINS-IRON FUMARATE 27 MG IRON-FOLIC ACID 0.8 MG TABLET 1 TABLET: at 08:53

## 2024-12-26 RX ADMIN — OXYCODONE HYDROCHLORIDE 10 MG: 10 TABLET ORAL at 12:39

## 2024-12-26 RX ADMIN — OXYCODONE 5 MG: 5 TABLET ORAL at 19:56

## 2024-12-26 RX ADMIN — IBUPROFEN 600 MG: 600 TABLET, FILM COATED ORAL at 08:53

## 2024-12-26 RX ADMIN — OXYCODONE 5 MG: 5 TABLET ORAL at 08:53

## 2024-12-26 RX ADMIN — DOCUSATE SODIUM 100 MG: 100 CAPSULE, LIQUID FILLED ORAL at 08:53

## 2024-12-26 RX ADMIN — ACETAMINOPHEN 650 MG: 325 TABLET ORAL at 17:00

## 2024-12-26 RX ADMIN — ACETAMINOPHEN 650 MG: 325 TABLET ORAL at 12:36

## 2024-12-26 RX ADMIN — IBUPROFEN 600 MG: 600 TABLET, FILM COATED ORAL at 15:00

## 2024-12-26 RX ADMIN — ACETAMINOPHEN 650 MG: 325 TABLET ORAL at 21:38

## 2024-12-26 RX ADMIN — IBUPROFEN 600 MG: 600 TABLET, FILM COATED ORAL at 01:31

## 2024-12-26 NOTE — LACTATION NOTE
Introduced myself to mom and dad. Booklet, Handout, and card given. Infant in NICU. Pumping encouraged every three hours, or at baby's feeding times for optimal milk initiation/ production.  All questions answered at this time. PRN Lactation Consultant contact encouraged.

## 2024-12-26 NOTE — PLAN OF CARE
Problem: Adult Inpatient Plan of Care  Goal: Plan of Care Review  Outcome: Progressing  Goal: Patient-Specific Goal (Individualized)  Outcome: Progressing  Goal: Absence of Hospital-Acquired Illness or Injury  Outcome: Progressing  Intervention: Identify and Manage Fall Risk  Recent Flowsheet Documentation  Taken 2024 0853 by Courtney Bergeron RN  Safety Promotion/Fall Prevention: safety round/check completed  Goal: Optimal Comfort and Wellbeing  Outcome: Progressing  Intervention: Monitor Pain and Promote Comfort  Recent Flowsheet Documentation  Taken 2024 1239 by Courtney Bergeron RN  Pain Management Interventions: pain medication given  Taken 2024 0853 by Courtney Bergeron RN  Pain Management Interventions: pain medication given  Intervention: Provide Person-Centered Care  Recent Flowsheet Documentation  Taken 2024 by Courtney Bergeron RN  Trust Relationship/Rapport:   care explained   choices provided   emotional support provided   empathic listening provided   questions encouraged   questions answered   reassurance provided   thoughts/feelings acknowledged  Goal: Readiness for Transition of Care  Outcome: Progressing     Problem: Postpartum ( Delivery)  Goal: Successful Parent Role Transition  Outcome: Progressing  Goal: Hemostasis  Outcome: Progressing  Goal: Effective Bowel Elimination  Outcome: Progressing  Goal: Fluid and Electrolyte Balance  Outcome: Progressing  Goal: Absence of Infection Signs and Symptoms  Outcome: Progressing  Goal: Anesthesia/Sedation Recovery  Outcome: Progressing  Intervention: Optimize Anesthesia Recovery  Recent Flowsheet Documentation  Taken 2024 by Courtney Bergeron RN  Patient Tolerance (IS): fair  Safety Promotion/Fall Prevention: safety round/check completed  Administration (IS): self-administered  Level Incentive Spirometer (mL): 1000  Number of Repetitions (IS): 3  Goal: Optimal Pain Control and Function  Outcome:  Progressing  Intervention: Prevent or Manage Pain  Recent Flowsheet Documentation  Taken 12/26/2024 1239 by Courtney Bergeron, RN  Pain Management Interventions: pain medication given  Taken 12/26/2024 0853 by Courtney Bergeron, RN  Pain Management Interventions: pain medication given  Goal: Nausea and Vomiting Relief  Outcome: Progressing  Goal: Effective Urinary Elimination  Outcome: Progressing  Intervention: Monitor and Manage Urinary Retention  Recent Flowsheet Documentation  Taken 12/26/2024 0853 by Courtney Bergeron, RN  Urinary Elimination Promotion: frequent voiding encouraged  Goal: Effective Oxygenation and Ventilation  Outcome: Progressing  Intervention: Optimize Oxygenation and Ventilation  Recent Flowsheet Documentation  Taken 12/26/2024 0853 by Courtney Bergeron, RN  Head of Bed (HOB) Positioning: HOB at 45 degrees   Goal Outcome Evaluation:

## 2024-12-26 NOTE — PAYOR COMM NOTE
"Jeanna Barbour (24 y.o. Female)       Date of Birth   2000    Social Security Number       Address   [address] [place], [place] [address]    Home Phone       MRN   [id number]       Episcopalian   None    Marital Status   Single                            Admission Date   24    Admission Type   Elective    Admitting Provider   Carolina Khan DO    Attending Provider   Darren Jones III, MD    Department, Room/Bed   [facility], W246/1       Discharge Date       Discharge Disposition       Discharge Destination                                 Attending Provider: Darren Jones III, MD    Allergies: No Known Allergies    Isolation: None   Infection: None   Code Status: CPR    Ht: 157.5 cm (62.01\")   Wt: 93 kg (205 lb)    Admission Cmt: None   Principal Problem: Hypertension affecting pregnancy [O16.9]                   Active Insurance as of 2024       Primary Coverage       Payor Plan Insurance Group Employer/Plan Group    [employer] [place] [employer] [place]        Payor Plan Address Payor Plan Phone Number Payor Plan Fax Number Effective Dates    [address] [address] [phone]  2019 - None Entered    [facility] [address]         Subscriber Name Subscriber Birth Date Member ID       JEANNA BARBOUR 2000 [id number]                     Emergency Contacts        (Rel.) Home Phone Work Phone Mobile Phone    PAPO RICHARDS (Grandparent) -- -- [phone]                 History & Physical        Darren Jones III, MD at 24 1513          Failed IOL. Cervix no change. H&P reviewed.  The patient was examined and there are no changes to the H&P. Will proceed with primary  section.   Electronically signed by Darren Jones III, MD at 24 1513   Source Note: H&P (View-Only)          OB Progress Note      Hospital Course: G 1,  P 0. Patient was admitted on 2024 11:45 AM with IUP at 37w6d weeks gestation secondary to Hypertension " affecting pregnancy [O16.9]  IUP (intrauterine pregnancy), incidental [Z33.1]. Cytotec x2.  Patient stable.     signs in last 24 hours:    Vital Signs Range for the last 24 hours              Temp:  [98 °F (36.7 °C)-98.6 °F (37 °C)] 98 °F (36.7 °C)  Heart Rate:  [] 82  Resp:  [18] 18  BP: (121-154)/(66-93) 132/89     Radiology     Imaging Results (Last 24 Hours)       ** No results found for the last 24 hours. **             Labs     Lab Results (last 24 hours)       Procedure Component Value Units Date/Time    POC Glucose Once [[id number]]  (Normal) Collected: 12/24/24 0546    Specimen: Blood Updated: 12/24/24 0551     Glucose 97 mg/dL     Treponema pallidum AB w/Reflex RPR [[id number]]  (Normal) Collected: 12/23/24 1400    Specimen: Blood Updated: 12/24/24 0201     Treponemal AB Total Non-Reactive    Narrative:      Reactive results will reflex RPR testing.    Group B Streptococcus Culture - Swab, Vaginal/Rectum [[id number]] Resulted: 12/11/24    Specimen: Swab from Vaginal/Rectum Updated: 12/23/24 1451     External Strep Group B Ag Negative    Fentanyl, Urine - Urine, Clean Catch [[id number]]  (Normal) Collected: 12/23/24 1329    Specimen: Urine, Clean Catch Updated: 12/23/24 1443     Fentanyl, Urine Negative    Narrative:      Negative Threshold:      Fentanyl 5 ng/mL     The normal value for the drug tested is negative. This report includes final unconfirmed screening results to be used for medical treatment purposes only. Unconfirmed results must not be used for non-medical purposes such as employment or legal testing. Clinical consideration should be applied to any drug of abuse test, particularly when unconfirmed results are used.           Urine Drug Screen - Urine, Clean Catch [[id number]]  (Normal) Collected: 12/23/24 1329    Specimen: Urine, Clean Catch Updated: 12/23/24 1435     THC, Screen, Urine Negative     Phencyclidine (PCP), Urine Negative     Cocaine Screen, Urine Negative     Methamphetamine,  Ur Negative     Opiate Screen Negative     Amphetamine Screen, Urine Negative     Benzodiazepine Screen, Urine Negative     Tricyclic Antidepressants Screen Negative     Methadone Screen, Urine Negative     Barbiturates Screen, Urine Negative     Oxycodone Screen, Urine Negative     Buprenorphine, Screen, Urine Negative    Narrative:      Cutoff For Drugs Screened:    Amphetamines               500 ng/ml  Barbiturates               200 ng/ml  Benzodiazepines            150 ng/ml  Cocaine                    150 ng/ml  Methadone                  200 ng/ml  Opiates                    100 ng/ml  Phencyclidine               25 ng/ml  THC                         50 ng/ml  Methamphetamine            500 ng/ml  Tricyclic Antidepressants  300 ng/ml  Oxycodone                  100 ng/ml  Buprenorphine               10 ng/ml    The normal value for all drugs tested is negative. This report includes unconfirmed screening results, with the cutoff values listed, to be used for medical treatment purposes only.  Unconfirmed results must not be used for non-medical purposes such as employment or legal testing.  Clinical consideration should be applied to any drug of abuse test, particularly when unconfirmed results are used.      CBC & Differential [[id number]]  (Abnormal) Collected: 12/23/24 1400    Specimen: Blood Updated: 12/23/24 1422    Narrative:      The following orders were created for panel order CBC & Differential.  Procedure                               Abnormality         Status                     ---------                               -----------         ------                     CBC Auto Differential[[id number]]        Abnormal            Final result                 Please view results for these tests on the individual orders.    CBC Auto Differential [[id number]]  (Abnormal) Collected: 12/23/24 1400    Specimen: Blood Updated: 12/23/24 1422     WBC 9.33 10*3/mm3      RBC 4.02 10*6/mm3      Hemoglobin 11.5 g/dL       Hematocrit 34.2 %      MCV 85.1 fL      MCH 28.6 pg      MCHC 33.6 g/dL      RDW 14.1 %      RDW-SD 43.6 fl      MPV 10.5 fL      Platelets 236 10*3/mm3      Neutrophil % 68.4 %      Lymphocyte % 22.7 %      Monocyte % 7.8 %      Eosinophil % 0.3 %      Basophil % 0.4 %      Immature Grans % 0.4 %      Neutrophils, Absolute 6.37 10*3/mm3      Lymphocytes, Absolute 2.12 10*3/mm3      Monocytes, Absolute 0.73 10*3/mm3      Eosinophils, Absolute 0.03 10*3/mm3      Basophils, Absolute 0.04 10*3/mm3      Immature Grans, Absolute 0.04 10*3/mm3      nRBC 0.0 /100 WBC               Review of Systems    The following systems were reviewed and negative;  ENT, respiratory, cardiovascular, gastrointestinal, genitourinary, breast, endocrine and allergies / immunologic.      Physical Exam:      General Appearance:    Alert, cooperative, in no acute distress   Head:    Normocephalic, without obvious abnormality, atraumatic   Eyes:            Lids and lashes normal, conjunctivae and sclerae normal, no   icterus, no pallor, corneas clear, PERRLA   Ears:    Ears appear intact with no abnormalities noted   Throat:   No oral lesions, no thrush, oral mucosa moist   Neck:   No adenopathy, supple, trachea midline, no thyromegaly, no     carotid bruit, no JVD   Back:     No kyphosis present, no scoliosis present, no skin lesions,       erythema or scars, no tenderness to percussion or                   palpation,   range of motion normal   Lungs:     Clear to auscultation,respirations regular, even and                   unlabored    Heart:    Regular rhythm and normal rate, normal S1 and S2, no            murmur, no gallop, no rub, no click   Breast Exam:    Deferred   Abdomen:     Normal bowel sounds, no masses, no organomegaly, soft        non-tender, non-distended, no guarding, no rebound                 tenderness   Genitalia:    Cervix: Dilated 1cm, 60cm   Extremities:   Moves all extremities well, no edema, no cyanosis, no               redness   Pulses:   Pulses palpable and equal bilaterally   Skin:   No bleeding, bruising or rash   Lymph nodes:   No palpable adenopathy   Neurologic:   Cranial nerves 2 - 12 grossly intact, sensation intact, DTR        present and equal bilaterally                 Assessment:  1.  GHTN. Type 2 Diabetic on Insulin. Cytotec x2.    Plan:  1. Will continue current plan of care. ANGIE Jones III, MD  12/24/24  08:51 EST      Electronically signed by Darren Jones III, MD at 12/24/24 0852                 Darren Jones III, MD at 12/24/24 0849          OB Progress Note      Hospital Course: G 1,  P 0. Patient was admitted on 12/23/2024 11:45 AM with IUP at 37w6d weeks gestation secondary to Hypertension affecting pregnancy [O16.9]  IUP (intrauterine pregnancy), incidental [Z33.1]. Cytotec x2.  Patient stable.     signs in last 24 hours:    Vital Signs Range for the last 24 hours              Temp:  [98 °F (36.7 °C)-98.6 °F (37 °C)] 98 °F (36.7 °C)  Heart Rate:  [] 82  Resp:  [18] 18  BP: (121-154)/(66-93) 132/89     Radiology     Imaging Results (Last 24 Hours)       ** No results found for the last 24 hours. **             Labs     Lab Results (last 24 hours)       Procedure Component Value Units Date/Time    POC Glucose Once [[id number]]  (Normal) Collected: 12/24/24 0546    Specimen: Blood Updated: 12/24/24 0551     Glucose 97 mg/dL     Treponema pallidum AB w/Reflex RPR [[id number]]  (Normal) Collected: 12/23/24 1400    Specimen: Blood Updated: 12/24/24 0201     Treponemal AB Total Non-Reactive    Narrative:      Reactive results will reflex RPR testing.    Group B Streptococcus Culture - Swab, Vaginal/Rectum [[id number]] Resulted: 12/11/24    Specimen: Swab from Vaginal/Rectum Updated: 12/23/24 1451     External Strep Group B Ag Negative    Fentanyl, Urine - Urine, Clean Catch [[id number]]  (Normal) Collected: 12/23/24 1329    Specimen: Urine, Clean Catch Updated: 12/23/24 1443     Fentanyl, Urine  Negative    Narrative:      Negative Threshold:      Fentanyl 5 ng/mL     The normal value for the drug tested is negative. This report includes final unconfirmed screening results to be used for medical treatment purposes only. Unconfirmed results must not be used for non-medical purposes such as employment or legal testing. Clinical consideration should be applied to any drug of abuse test, particularly when unconfirmed results are used.           Urine Drug Screen - Urine, Clean Catch [[id number]]  (Normal) Collected: 12/23/24 1329    Specimen: Urine, Clean Catch Updated: 12/23/24 1435     THC, Screen, Urine Negative     Phencyclidine (PCP), Urine Negative     Cocaine Screen, Urine Negative     Methamphetamine, Ur Negative     Opiate Screen Negative     Amphetamine Screen, Urine Negative     Benzodiazepine Screen, Urine Negative     Tricyclic Antidepressants Screen Negative     Methadone Screen, Urine Negative     Barbiturates Screen, Urine Negative     Oxycodone Screen, Urine Negative     Buprenorphine, Screen, Urine Negative    Narrative:      Cutoff For Drugs Screened:    Amphetamines               500 ng/ml  Barbiturates               200 ng/ml  Benzodiazepines            150 ng/ml  Cocaine                    150 ng/ml  Methadone                  200 ng/ml  Opiates                    100 ng/ml  Phencyclidine               25 ng/ml  THC                         50 ng/ml  Methamphetamine            500 ng/ml  Tricyclic Antidepressants  300 ng/ml  Oxycodone                  100 ng/ml  Buprenorphine               10 ng/ml    The normal value for all drugs tested is negative. This report includes unconfirmed screening results, with the cutoff values listed, to be used for medical treatment purposes only.  Unconfirmed results must not be used for non-medical purposes such as employment or legal testing.  Clinical consideration should be applied to any drug of abuse test, particularly when unconfirmed results are  used.      CBC & Differential [[id number]]  (Abnormal) Collected: 12/23/24 1400    Specimen: Blood Updated: 12/23/24 1422    Narrative:      The following orders were created for panel order CBC & Differential.  Procedure                               Abnormality         Status                     ---------                               -----------         ------                     CBC Auto Differential[[id number]]        Abnormal            Final result                 Please view results for these tests on the individual orders.    CBC Auto Differential [[id number]]  (Abnormal) Collected: 12/23/24 1400    Specimen: Blood Updated: 12/23/24 1422     WBC 9.33 10*3/mm3      RBC 4.02 10*6/mm3      Hemoglobin 11.5 g/dL      Hematocrit 34.2 %      MCV 85.1 fL      MCH 28.6 pg      MCHC 33.6 g/dL      RDW 14.1 %      RDW-SD 43.6 fl      MPV 10.5 fL      Platelets 236 10*3/mm3      Neutrophil % 68.4 %      Lymphocyte % 22.7 %      Monocyte % 7.8 %      Eosinophil % 0.3 %      Basophil % 0.4 %      Immature Grans % 0.4 %      Neutrophils, Absolute 6.37 10*3/mm3      Lymphocytes, Absolute 2.12 10*3/mm3      Monocytes, Absolute 0.73 10*3/mm3      Eosinophils, Absolute 0.03 10*3/mm3      Basophils, Absolute 0.04 10*3/mm3      Immature Grans, Absolute 0.04 10*3/mm3      nRBC 0.0 /100 WBC               Review of Systems    The following systems were reviewed and negative;  ENT, respiratory, cardiovascular, gastrointestinal, genitourinary, breast, endocrine and allergies / immunologic.      Physical Exam:      General Appearance:    Alert, cooperative, in no acute distress   Head:    Normocephalic, without obvious abnormality, atraumatic   Eyes:            Lids and lashes normal, conjunctivae and sclerae normal, no   icterus, no pallor, corneas clear, PERRLA   Ears:    Ears appear intact with no abnormalities noted   Throat:   No oral lesions, no thrush, oral mucosa moist   Neck:   No adenopathy, supple, trachea midline, no  thyromegaly, no     carotid bruit, no JVD   Back:     No kyphosis present, no scoliosis present, no skin lesions,       erythema or scars, no tenderness to percussion or                   palpation,   range of motion normal   Lungs:     Clear to auscultation,respirations regular, even and                   unlabored    Heart:    Regular rhythm and normal rate, normal S1 and S2, no            murmur, no gallop, no rub, no click   Breast Exam:    Deferred   Abdomen:     Normal bowel sounds, no masses, no organomegaly, soft        non-tender, non-distended, no guarding, no rebound                 tenderness   Genitalia:    Cervix: Dilated 1cm, 60cm   Extremities:   Moves all extremities well, no edema, no cyanosis, no              redness   Pulses:   Pulses palpable and equal bilaterally   Skin:   No bleeding, bruising or rash   Lymph nodes:   No palpable adenopathy   Neurologic:   Cranial nerves 2 - 12 grossly intact, sensation intact, DTR        present and equal bilaterally                 Assessment:  1.  GHTN. Type 2 Diabetic on Insulin. Cytotec x2.    Plan:  1. Will continue current plan of care. IOL       Darren Jones III, MD  24  08:51 EST      Electronically signed by Darren Jones III, MD at 24 0852       Carolina Khan DO at 24 1750           Chu  Obstetric History and Physical    Chief Complaint   Patient presents with    Hypertension-Pregnant       Subjective    Patient is a 24 y.o. female  currently at 37w5d, who presents with IUGR and GHTN for IOL.    Her prenatal care is complicated by  hypertension  gestational hypertension.  Her previous obstetric/gynecological history is noted for is non-contributory.    The following portions of the patient's history were reviewed and updated as appropriate: current medications, allergies, past medical history, past surgical history, past family history, past social history, and problem list .   Social History      Socioeconomic History    Marital status: Single   Tobacco Use    Smoking status: Never    Smokeless tobacco: Never   Vaping Use    Vaping status: Never Used   Substance and Sexual Activity    Alcohol use: Never    Drug use: Never    Sexual activity: Yes     Past Medical History:   Diagnosis Date    Diabetes mellitus     TYPE II ON INSULIN       Current Facility-Administered Medications:     acetaminophen (TYLENOL) tablet 650 mg, 650 mg, Oral, Q4H PRN, Carolina Khan, , 650 mg at 12/23/24 1745    butorphanol (STADOL) injection 1 mg, 1 mg, Intravenous, Q2H PRN, Carolina Khan DO    butorphanol (STADOL) injection 2 mg, 2 mg, Intravenous, Q3H PRN, Carolina Khan DO    dextrose (D50W) (25 g/50 mL) IV injection 25 g, 25 g, Intravenous, Q15 Min PRN, Carolina Khan DO    dextrose (GLUTOSE) oral gel 15 g, 15 g, Oral, Q15 Min PRN, Carolina Khan DO    glucagon HCl (Diagnostic) injection 1 mg, 1 mg, Intramuscular, Q15 Min PRN, Carolina Khan DO    insulin glargine (LANTUS, SEMGLEE) injection 28 Units, 28 Units, Subcutaneous, BID, Carolina Khan DO    Insulin Lispro (humaLOG) injection 2-9 Units, 2-9 Units, Subcutaneous, 4x Daily AC & at Bedtime, Carolina Khan DO    lactated ringers bolus 1,000 mL, 1,000 mL, Intravenous, Once PRN, Carolina Khan DO    lactated ringers infusion, 125 mL/hr, Intravenous, Continuous, Carolina Khan DO    miSOPROStol (CYTOTEC) split tablet 25 mcg, 25 mcg, Vaginal, Q4H, Carolina Khan DO    ondansetron ODT (ZOFRAN-ODT) disintegrating tablet 4 mg, 4 mg, Oral, Q6H PRN **OR** ondansetron (ZOFRAN) injection 4 mg, 4 mg, Intravenous, Q6H PRN, Carolina Khan DO    [START ON 12/24/2024] prenatal vitamin tablet 1 tablet, 1 tablet, Oral, Daily, Khan, Carolina Maggi, DO    sodium chloride (NS) irrigation solution 1,000 mL, 1,000 mL, Irrigation, Once PRN, Carolina Khan  "Maggi,     sodium chloride 0.9 % infusion 40 mL, 40 mL, Intravenous, PRN, Carolina Khan DO    terbutaline (BRETHINE) injection 0.2 mg, 0.2 mg, Subcutaneous, PRN, Carolina Khan,   No Known Allergies  Past Surgical History:   Procedure Laterality Date    NO PAST SURGERIES           Prenatal Information:   Maternal Prenatal Labs  Blood Type ABO Type   Date Value Ref Range Status   12/23/2024 O  Final      Rh Status RH type   Date Value Ref Range Status   12/23/2024 Positive  Final      Antibody Screen Antibody Screen   Date Value Ref Range Status   12/23/2024 Negative  Final      Rapid Urine Drug Screen Barbiturates Screen, Urine   Date Value Ref Range Status   12/23/2024 Negative Negative Final     Benzodiazepine Screen, Urine   Date Value Ref Range Status   12/23/2024 Negative Negative Final     Methadone Screen, Urine   Date Value Ref Range Status   12/23/2024 Negative Negative Final     Opiate Screen   Date Value Ref Range Status   12/23/2024 Negative Negative Final     THC, Screen, Urine   Date Value Ref Range Status   12/23/2024 Negative Negative Final     Cocaine Screen, Urine   Date Value Ref Range Status   12/23/2024 Negative Negative Final     Amphetamine Screen, Urine   Date Value Ref Range Status   12/23/2024 Negative Negative Final     Buprenorphine, Screen, Urine   Date Value Ref Range Status   12/23/2024 Negative Negative Final     Methamphetamine, Ur   Date Value Ref Range Status   12/23/2024 Negative Negative Final     Oxycodone Screen, Urine   Date Value Ref Range Status   12/23/2024 Negative Negative Final     Tricyclic Antidepressants Screen   Date Value Ref Range Status   12/23/2024 Negative Negative Final      Group B Strep Culture No results found for: \"GBSANTIGEN\"           External Prenatal Results       Pregnancy Outside Results - Transcribed From Office Records - See Scanned Records For Details       Test Value Date Time    ABO  O  12/23/24 1542    Rh  Positive  " 24 1542    Antibody Screen  Negative  24 1401      ^ Negative  24     Varicella IgG       Rubella       Hgb  11.5 g/dL 24 1400      ^ 10.9 g/dL 10/25/24 1121    Hct  34.2 % 24 1400      ^ 32.9 % 10/25/24 1121    HgB A1c        1h GTT       3h GTT Fasting       3h GTT 1 hour       3h GTT 2 hour       3h GTT 3 hour        Gonorrhea (discrete) ^ Negative  24 1501    Chlamydia (discrete) ^ Negative  24 1501    RPR ^ Nonreactive  10/25/24 1121      ^ Non-Reactive  24     Syphils cascade: TP-Ab (FTA)       TP-Ab       TP-Ab (EIA)       TPPA       HBsAg ^ Negative  24     Herpes Simplex Virus PCR       Herpes Simplex VIrus Culture       HIV ^ Negative  24     Hep C RNA Quant PCR       Hep C Antibody ^ NEGATIVE  24     AFP       NIPT       Cystic Fibrosis ([place]is)       Cystic Fibroisis        Spinal Muscular atrophy       Fragile X       Group B Strep ^ Negative  24     GBS Susceptibility to Clindamycin       GBS Susceptibility to Erythromycin       Fetal Fibronectin       Genetic Testing, Maternal Blood                 Drug Screening       Test Value Date Time    Urine Drug Screen       Amphetamine Screen  Negative  24 1329    Barbiturate Screen  Negative  24 1329    Benzodiazepine Screen  Negative  24 1329    Methadone Screen  Negative  24 1329    Phencyclidine Screen  Negative  24 1329    Opiates Screen  Negative  24 1329    THC Screen  Negative  24 1329    Cocaine Screen       Propoxyphene Screen       Buprenorphine Screen  Negative  24 1329    Methamphetamine Screen       Oxycodone Screen  Negative  24 1329    Tricyclic Antidepressants Screen  Negative  24 1329              Legend    ^: Historical                              Past OB History:     OB History    Para Term  AB Living   1 0 0 0 0 0   SAB IAB Ectopic Molar Multiple Live Births   0 0 0 0 0 0      # Outcome Date GA  Lbr Stephen/2nd Weight Sex Type Anes PTL Lv   1 Current                Past Medical History: Past Medical History:   Diagnosis Date    Diabetes mellitus     TYPE II ON INSULIN      Past Surgical History Past Surgical History:   Procedure Laterality Date    NO PAST SURGERIES        Family History: Family History   Problem Relation Age of Onset    Diabetes Father       Social History:  reports that she has never smoked. She has never used smokeless tobacco.   reports no history of alcohol use.   reports no history of drug use.        Review of Systems-all negative except as noted in HPI      Objective    Vital Signs Range for the last 24 hours  Temperature: Temp:  [98.6 °F (37 °C)] 98.6 °F (37 °C)   Temp Source: Temp src: Oral   BP: BP: (121-154)/(66-93) 152/85   Pulse: Heart Rate:  [] 104   Respirations: Resp:  [18] 18   Weight: Weight:  [93 kg (205 lb)] 93 kg (205 lb)     Physical Examination: General appearance - alert, well appearing, and in no distress, oriented to person, place, and time, normal appearing weight and well hydrated  Mental status - alert, oriented to person, place, and time, normal mood, behavior, speech, dress, motor activity, and thought processes, affect appropriate to mood  Neck - supple, no significant adenopathy  Chest - clear to auscultation, no wheezes, rales or rhonchi, symmetric air entry, no tachypnea, retractions or cyanosis  Heart - normal rate, regular rhythm, normal S1, S2, no murmurs, rubs, clicks or gallops  Abdomen - soft, nontender, gravid uterus, no masses or organomegaly  no rebound tenderness noted,   Pelvic - normal external genitalia, vulva, vagina, cervix, uterus and adnexa  Neurological - alert, oriented, normal speech, no focal findings or movement disorder noted  Musculoskeletal - no joint tenderness, deformity or swelling  Extremities - peripheral pulses normal, no pedal edema, no clubbing or cyanosis  Skin - normal coloration and turgor, no rashes, no suspicious  skin lesions noted        Cervix: Exam by:     Dilation:     Effacement:     Station:       Laboratory Results:   Lab Results (last 24 hours)       Procedure Component Value Units Date/Time    Group B Streptococcus Culture - Swab, Vaginal/Rectum [[id number]] Resulted: 12/11/24    Specimen: Swab from Vaginal/Rectum Updated: 12/23/24 1451     External Strep Group B Ag Negative    Fentanyl, Urine - Urine, Clean Catch [[id number]]  (Normal) Collected: 12/23/24 1329    Specimen: Urine, Clean Catch Updated: 12/23/24 1443     Fentanyl, Urine Negative    Narrative:      Negative Threshold:      Fentanyl 5 ng/mL     The normal value for the drug tested is negative. This report includes final unconfirmed screening results to be used for medical treatment purposes only. Unconfirmed results must not be used for non-medical purposes such as employment or legal testing. Clinical consideration should be applied to any drug of abuse test, particularly when unconfirmed results are used.           Urine Drug Screen - Urine, Clean Catch [[id number]]  (Normal) Collected: 12/23/24 1329    Specimen: Urine, Clean Catch Updated: 12/23/24 1435     THC, Screen, Urine Negative     Phencyclidine (PCP), Urine Negative     Cocaine Screen, Urine Negative     Methamphetamine, Ur Negative     Opiate Screen Negative     Amphetamine Screen, Urine Negative     Benzodiazepine Screen, Urine Negative     Tricyclic Antidepressants Screen Negative     Methadone Screen, Urine Negative     Barbiturates Screen, Urine Negative     Oxycodone Screen, Urine Negative     Buprenorphine, Screen, Urine Negative    Narrative:      Cutoff For Drugs Screened:    Amphetamines               500 ng/ml  Barbiturates               200 ng/ml  Benzodiazepines            150 ng/ml  Cocaine                    150 ng/ml  Methadone                  200 ng/ml  Opiates                    100 ng/ml  Phencyclidine               25 ng/ml  THC                         50  ng/ml  Methamphetamine            500 ng/ml  Tricyclic Antidepressants  300 ng/ml  Oxycodone                  100 ng/ml  Buprenorphine               10 ng/ml    The normal value for all drugs tested is negative. This report includes unconfirmed screening results, with the cutoff values listed, to be used for medical treatment purposes only.  Unconfirmed results must not be used for non-medical purposes such as employment or legal testing.  Clinical consideration should be applied to any drug of abuse test, particularly when unconfirmed results are used.      CBC & Differential [[id number]]  (Abnormal) Collected: 12/23/24 1400    Specimen: Blood Updated: 12/23/24 1422    Narrative:      The following orders were created for panel order CBC & Differential.  Procedure                               Abnormality         Status                     ---------                               -----------         ------                     CBC Auto Differential[[id number]]        Abnormal            Final result                 Please view results for these tests on the individual orders.    CBC Auto Differential [[id number]]  (Abnormal) Collected: 12/23/24 1400    Specimen: Blood Updated: 12/23/24 1422     WBC 9.33 10*3/mm3      RBC 4.02 10*6/mm3      Hemoglobin 11.5 g/dL      Hematocrit 34.2 %      MCV 85.1 fL      MCH 28.6 pg      MCHC 33.6 g/dL      RDW 14.1 %      RDW-SD 43.6 fl      MPV 10.5 fL      Platelets 236 10*3/mm3      Neutrophil % 68.4 %      Lymphocyte % 22.7 %      Monocyte % 7.8 %      Eosinophil % 0.3 %      Basophil % 0.4 %      Immature Grans % 0.4 %      Neutrophils, Absolute 6.37 10*3/mm3      Lymphocytes, Absolute 2.12 10*3/mm3      Monocytes, Absolute 0.73 10*3/mm3      Eosinophils, Absolute 0.03 10*3/mm3      Basophils, Absolute 0.04 10*3/mm3      Immature Grans, Absolute 0.04 10*3/mm3      nRBC 0.0 /100 WBC     Treponema pallidum AB w/Reflex RPR [[id number]] Collected: 12/23/24 1400    Specimen: Blood  "Updated: 12/23/24 1418          Radiology Review:   Imaging Results (Last 72 Hours)       ** No results found for the last 72 hours. **              Assessment & Plan      Hypertension affecting pregnancy      Assessment & Plan    Assessment:  1.  Intrauterine pregnancy at 37w5d weeks gestation with reassuring fetal status.    2.  induction of labor  for IUGR, GHTN  with unfavorable cervix  3.  Obstetrical history significant for is noncontributory.  4.  GBS status: No results found for: \"GBSANTIGEN\"    Plan:  1. fetal and uterine monitoring  continuously and cervical ripening with  Misoprostol  2. Plan of care has been reviewed with patient   3.  Risks, benefits of treatment plan have been discussed.  4.  All questions have been answered.        Carolina Khan DO  12/23/2024  17:50 EST      Electronically signed by Carolina Khan DO at 12/23/24 1751       Orders (all)        Start     Ordered    12/26/24 0100  ibuprofen (ADVIL,MOTRIN) tablet 600 mg  Every 6 Hours        Placed in \"Followed by\" Linked Group    12/24/24 2148 12/25/24 2200  acetaminophen (TYLENOL) tablet 650 mg  Every 6 Hours        Placed in \"Followed by\" Linked Group    12/24/24 2148 12/25/24 2200  Enoxaparin Sodium (LOVENOX) syringe 40 mg  Nightly         12/24/24 2148 12/25/24 0900  docusate sodium (COLACE) capsule 100 mg  2 Times Daily PRN         12/24/24 2148 12/25/24 0900  prenatal vitamin tablet 1 tablet  Daily         12/24/24 2148 12/25/24 0800  Ambulate Patient  2 Times Daily      Comments: After anesthesia wears off.    12/24/24 2148 12/25/24 0600  Discontinue Indwelling Urinary Catheter in AM  Once         12/24/24 2148 12/25/24 0600  CBC & Differential  Timed         12/24/24 2148 12/25/24 0600  CBC Auto Differential  PROCEDURE ONCE         12/24/24 2202 12/25/24 0459  Diet: Regular/House, Diabetic; Consistent Carbohydrate; Fluid Consistency: Thin (IDDSI 0)  Diet Effective Now         " "12/25/24 0459    12/25/24 0100  ketorolac (TORADOL) injection 15 mg  Every 6 Hours        Placed in \"Followed by\" Linked Group    12/24/24 2148 12/25/24 0000  Remove Abdominal Dressing  Once         12/24/24 2148 12/24/24 2202  miSOPROStol (CYTOTEC) tablet 600 mcg  Once As Needed         12/24/24 2203 12/24/24 2200  oxytocin (PITOCIN) 30 units in 0.9% sodium chloride 500 mL (premix)  Continuous        Placed in \"Followed by\" Linked Group    12/24/24 2045 12/24/24 2200  ibuprofen (ADVIL,MOTRIN) tablet 800 mg  Every 8 Hours Scheduled,   Status:  Discontinued         12/24/24 2045 12/24/24 2200  oxytocin (PITOCIN) 30 units in 0.9% sodium chloride 500 mL (premix)  Continuous        Placed in \"Followed by\" Linked Group    12/24/24 2045 12/24/24 2200  Respirations  Every Hour      Comments: If respiratory rate is less than 10/min, notify the Anesthesiologist    12/24/24 2106 12/24/24 2200  Incentive spirometry RT  Every 2 Hours While Awake       12/24/24 2148 12/24/24 2200  acetaminophen (TYLENOL) tablet 1,000 mg  Every 6 Hours        Placed in \"Followed by\" Linked Group    12/24/24 2148 12/24/24 2145  Vital Signs Per Hospital Policy  Per Hospital Policy         12/24/24 2148 12/24/24 2145  Notify Physician  Until Discontinued         12/24/24 2148 12/24/24 2145  Up As Tolerated  Until Discontinued         12/24/24 2148 12/24/24 2145  Patient May Shower  Once        Comments: After anesthesia wears off and with assistance    12/24/24 2148 12/24/24 2145  Diet: Regular/House; Fluid Consistency: Thin (IDDSI 0)  Diet Effective Now,   Status:  Canceled         12/24/24 2148 12/24/24 2145  Advance Diet As Tolerated -Regular  Until Discontinued         12/24/24 2148 12/24/24 2145  Fundal and Lochia Check  Per Hospital Policy        Comments: Q 30 min x 2, Q 1 hr x 4, Q 4 hrs x 24 hrs, then Q shift.    12/24/24 2148 12/24/24 2145  Continue Indwelling Urinary Catheter Already in " "Place  Once         12/24/24 2148 12/24/24 2145  Notify Provider if Bladder Distention Continues  Until Discontinued         12/24/24 2148 12/24/24 2145  Urinary Catheter Care  Every Shift,   Status:  Canceled       12/24/24 2148 12/24/24 2145  Turn Cough Deep Breathe  Once         12/24/24 2148 12/24/24 2145  Breast pump to bed  Once         12/24/24 2148 12/24/24 2145  If indicated -- Please administer RH Immunoglobulin based on results of cord blood evaluation and fetal screen lab tests, pharmacy to dispense  Continuous        Comments: See process instructions for reference range details.    12/24/24 2148 12/24/24 2145  Saline Lock & Maintain IV Access  Continuous         12/24/24 2148 12/24/24 2145  Place Sequential Compression Device  Once         12/24/24 2148 12/24/24 2145  Maintain Sequential Compression Device  Continuous         12/24/24 2148 12/24/24 2144  diphenhydrAMINE (BENADRYL) capsule 25 mg  Every 4 Hours PRN         12/24/24 2148 12/24/24 2144  aluminum-magnesium hydroxide-simethicone (MAALOX MAX) 400-400-40 MG/5ML suspension 15 mL  Every 4 Hours PRN        Placed in \"Or\" Linked Group    12/24/24 2148 12/24/24 2144  calcium carbonate (TUMS) chewable tablet 500 mg (200 mg elemental)  Every 4 Hours PRN        Placed in \"Or\" Linked Group    12/24/24 2148 12/24/24 2144  hydrOXYzine (ATARAX) tablet 50 mg  Nightly PRN         12/24/24 2148 12/24/24 2144  Varicella Virus Vaccine Live (VARIVAX) vaccine 0.5 mL  During Hospitalization         12/24/24 2148 12/24/24 2144  Measles, Mumps & Rubella Vac (MMR) injection 0.5 mL  During Hospitalization         12/24/24 2148 12/24/24 2144  sodium chloride 0.9 % flush 3-10 mL  As Needed         12/24/24 2148 12/24/24 2144  sodium chloride 0.9 % infusion 40 mL  As Needed         12/24/24 2148 12/24/24 2144  oxytocin (PITOCIN) 30 units in 0.9% sodium chloride 500 mL (premix)  Once As Needed         12/24/24 1592    " "12/24/24 2144  oxyCODONE (ROXICODONE) immediate release tablet 5 mg  Every 4 Hours PRN        Placed in \"Or\" Linked Group    12/24/24 2148 12/24/24 2144  oxyCODONE (ROXICODONE) immediate release tablet 10 mg  Every 4 Hours PRN        Placed in \"Or\" Linked Group    12/24/24 2148 12/24/24 2144  simethicone (MYLICON) chewable tablet 80 mg  4 Times Daily PRN         12/24/24 2148 12/24/24 2144  ondansetron ODT (ZOFRAN-ODT) disintegrating tablet 4 mg  Every 6 Hours PRN        Placed in \"Or\" Linked Group    12/24/24 2148 12/24/24 2144  ondansetron (ZOFRAN) injection 4 mg  Every 6 Hours PRN        Placed in \"Or\" Linked Group    12/24/24 2148 12/24/24 2144  lanolin topical 1 Application  Every 1 Hour PRN         12/24/24 2148 12/24/24 2144  carboprost (HEMABATE) injection 250 mcg  Every 4 Hours PRN         12/24/24 2148 12/24/24 2144  miSOPROStol (CYTOTEC) tablet 600 mcg  Every 4 Hours PRN,   Status:  Discontinued         12/24/24 2148 12/24/24 2144  methylergonovine (METHERGINE) injection 200 mcg  Once As Needed         12/24/24 2148 12/24/24 2107  Oxygen Therapy- Nasal Cannula; 2 LPM  Continuous         12/24/24 2106 12/24/24 2107  If respiratory is less than 8/min, see Narcan order below. Notify Anesthesiologist STAT.  Until Discontinued         12/24/24 2106 12/24/24 2107  Blood Pressure and Pulse Every 4 Hours  Continuous         12/24/24 2106 12/24/24 2107  Activity & Removal of Tate Catheter, Per Obstetrician  Continuous         12/24/24 2106 12/24/24 2107  Notify Anesthesiologist for Any Questions / Problems  Continuous         12/24/24 2106 12/24/24 2107  Notify Anesthesia for Temp Over 101.4F  Continuous         12/24/24 2106 12/24/24 2107  Ambu Bag at Bedside  Continuous         12/24/24 2106 12/24/24 2100  miSOPROStol (CYTOTEC) split tablet 25 mcg  Every 4 Hours PRN,   Status:  Discontinued         12/24/24 0405    12/24/24 2049  ondansetron (ZOFRAN) injection 4 " "mg  Once As Needed         12/24/24 2049 12/24/24 2049  diphenhydrAMINE (BENADRYL) capsule 25 mg  Every 4 Hours PRN,   Status:  Discontinued        Placed in \"Or\" Linked Group    12/24/24 2049 12/24/24 2049  diphenhydrAMINE (BENADRYL) injection 25 mg  Every 4 Hours PRN,   Status:  Discontinued        Placed in \"Or\" Linked Group    12/24/24 2049 12/24/24 2049  diphenhydrAMINE (BENADRYL) injection 25 mg  Every 4 Hours PRN,   Status:  Discontinued        Placed in \"Or\" Linked Group    12/24/24 2049 12/24/24 2049  naloxone (NARCAN) injection 0.1 mg  Every 1 Hour PRN         12/24/24 2049 12/24/24 2049  morphine injection 2 mg  Every 1 Hour PRN         12/24/24 2049 12/24/24 2046  Notify Provider (Specified)  Until Discontinued         12/24/24 2045 12/24/24 2046  Vital Signs Per Hospital Policy  Per Hospital Policy         12/24/24 2045 12/24/24 2046  Up as Tolerated  Until Discontinued         12/24/24 2045 12/24/24 2046  Diet: Regular/House; Fluid Consistency: Thin (IDDSI 0)  Diet Effective Now,   Status:  Canceled         12/24/24 2045 12/24/24 2046  Notify Physician (specified)  Until Discontinued         12/24/24 2045 12/24/24 2046  Vital Signs Per hospital policy  Per Hospital Policy         12/24/24 2045 12/24/24 2046  Strict Bed Rest  Until Discontinued         12/24/24 2045 12/24/24 2046  Fundal & Lochia Check  Per Order Details        Comments: Every 15 Minutes x4, Then Every 30 Minutes x2, Then Every Shift    12/24/24 2045 12/24/24 2046  Fundal & Lochia Check  Every Shift       12/24/24 2045 12/24/24 2046  Diet: Regular/House; Fluid Consistency: Thin (IDDSI 0)  Diet Effective Now,   Status:  Canceled         12/24/24 2045 12/24/24 2046  Advance Diet As Tolerated -  Until Discontinued         12/24/24 2045 12/24/24 2045  oxytocin (PITOCIN) 30 units in 0.9% sodium chloride 500 mL (premix)  Once        Placed in \"Followed by\" Linked Group    12/24/24 2045    " "12/24/24 2045  Fundal & Lochia Check  Every Shift       12/24/24 2045 12/24/24 2045  acetaminophen (TYLENOL) tablet 650 mg  Every 4 Hours PRN         12/24/24 2045 12/24/24 2045  HYDROcodone-acetaminophen (NORCO) 5-325 MG per tablet 1 tablet  Every 4 Hours PRN         12/24/24 2045 12/24/24 2045  oxytocin (PITOCIN) 30 units in 0.9% sodium chloride 500 mL (premix)  Once        Placed in \"Followed by\" Linked Group    12/24/24 2045 12/24/24 2045  diphenhydrAMINE (BENADRYL) capsule 25 mg  Every 4 Hours PRN,   Status:  Discontinued        Placed in \"Or\" Linked Group    12/24/24 2045 12/24/24 2045  diphenhydrAMINE (BENADRYL) injection 25 mg  Every 4 Hours PRN        Placed in \"Or\" Linked Group    12/24/24 2045 12/24/24 2045  diphenhydrAMINE (BENADRYL) injection 25 mg  Every 4 Hours PRN        Placed in \"Or\" Linked Group    12/24/24 2045 12/24/24 2045  naloxone (NARCAN) injection 0.1 mg  Every 1 Hour PRN,   Status:  Discontinued         12/24/24 2045 12/24/24 2045  morphine injection 2 mg  Every 1 Hour PRN,   Status:  Discontinued         12/24/24 2045 12/24/24 2044  ondansetron (ZOFRAN) injection 4 mg  Once As Needed         12/24/24 2045 12/24/24 1815  ketorolac (TORADOL) injection 30 mg  Once         12/24/24 1716 12/24/24 1800  Respirations  Every Hour      Comments: If respiratory rate is less than 10/min, notify the Anesthesiologist    12/24/24 1716    12/24/24 1717  Transfer to Postpartum When Criteria Met  Continuous         12/24/24 1716    12/24/24 1717  Oxygen Therapy- Nasal Cannula; 2 LPM  Continuous,   Status:  Canceled         12/24/24 1716    12/24/24 1717  If respiratory is less than 8/min, see Narcan order below. Notify Anesthesiologist STAT.  Until Discontinued         12/24/24 1716    12/24/24 1717  Blood Pressure and Pulse Every 4 Hours  Continuous,   Status:  Canceled         12/24/24 1716    12/24/24 1717  Activity & Removal of Tate Catheter, Per Obstetrician  " "Continuous,   Status:  Canceled         12/24/24 1716    12/24/24 1717  Notify Anesthesiologist for Any Questions / Problems  Continuous,   Status:  Canceled         12/24/24 1716    12/24/24 1717  Notify Anesthesia for Temp Over 101.4F  Continuous,   Status:  Canceled         12/24/24 1716    12/24/24 1717  Ambu Bag at Bedside  Continuous         12/24/24 1716    12/24/24 1716  tranexamic acid 1000 mg in 100 mL 0.7% NaCl infusion (premix)  Once As Needed,   Status:  Discontinued         12/24/24 1716    12/24/24 1716  ondansetron ODT (ZOFRAN-ODT) disintegrating tablet 4 mg  Every 6 Hours PRN,   Status:  Discontinued        Placed in \"Or\" Linked Group    12/24/24 1716    12/24/24 1716  ondansetron (ZOFRAN) injection 4 mg  Every 6 Hours PRN,   Status:  Discontinued        Placed in \"Or\" Linked Group    12/24/24 1716    12/24/24 1716  promethazine (PHENERGAN) suppository 12.5 mg  Every 6 Hours PRN,   Status:  Discontinued        Placed in \"Or\" Linked Group    12/24/24 1716    12/24/24 1716  promethazine (PHENERGAN) tablet 12.5 mg  Every 6 Hours PRN,   Status:  Discontinued        Placed in \"Or\" Linked Group    12/24/24 1716    12/24/24 1716  metoclopramide (REGLAN) injection 10 mg  Every 6 Hours PRN,   Status:  Discontinued         12/24/24 1716    12/24/24 1716  hydrOXYzine (ATARAX) tablet 50 mg  Nightly PRN,   Status:  Discontinued         12/24/24 1716    12/24/24 1716  methylergonovine (METHERGINE) injection 200 mcg  As Needed,   Status:  Discontinued         12/24/24 1716    12/24/24 1716  carboprost (HEMABATE) injection 250 mcg  As Needed,   Status:  Discontinued         12/24/24 1716    12/24/24 1716  miSOPROStol (CYTOTEC) tablet 600 mcg  As Needed,   Status:  Discontinued         12/24/24 1716    12/24/24 1600  Vital Signs q 4 while awake  Every 4 Hours      Comments: While the patient is awake.    12/24/24 1459    12/24/24 1545  lactated ringers bolus 1,000 mL  Once,   Status:  Discontinued         12/24/24 " 1459    24 1545  acetaminophen (TYLENOL) tablet 1,000 mg  Once,   Status:  Discontinued         24 1459    24 1545  ceFAZolin 2000 mg IVPB in 100 mL NS (VTB)  Once         24 1459    24 1504  Notify physician for the following conditions:  Until Discontinued         24 1503    24 1503  ondansetron (ZOFRAN) injection 4 mg  Once As Needed,   Status:  Discontinued         24 1503    24 1503  famotidine (PEPCID) injection 20 mg  Once As Needed,   Status:  Discontinued         24 1503    24 1459  Code Status and Medical Interventions: CPR (Attempt to Resuscitate); Full Support  Continuous         24 1459    24 1459  Obtain informed consent  Once         24 1459    24 1459  Vital Signs Per hospital policy  Per Hospital Policy         24 1459    24 1459  External Uterine Contraction Monitoring  Per Hospital Policy         24 1459    24 1459  Notify Physician (specified)  Until Discontinued         24 1459    24 1459  Notify physician for tachysystole (per hospital algorithm)  Until Discontinued         24 1459    24 1459  Notify physician if membranes ruptured, bleeding greater than 1 pad an hour, fetal heart tone abnormality, and severe pain  Until Discontinued         24 1459    24 1459  Up as Tolerated  Until Discontinued         24 1459    24 1459  Cervical Exam  Once        Comments: Unless contraindicated, every 1-2 hours in active labor, or at nurse's discretion.    24 1459    24 1459  Initiate Group Beta Strep (GBS) Prophylaxis Protocol, If Criteria Met  Continuous        Comments: NO TREATMENT RECOMMENDED IF: 1)  Maternal GBS status known negative 2)  Scheduled  birth with intact membranes, not in labor.  3 ) Maternal GBS unknown, no risk factors.   TREAT WITH ANTIBIOTICS IF:  1)  Maternal GBS status is known postive.  2)  Maternal GBS  "status unknown with these risk factors:  a)  Previous infant affected by GBS infection.  b)  GBS urinary tract infection (UTI) or bacteruria during pregnancy  c)  Unexplained maternal fever in labor (greater than or equal to 100.4F or 38.0C)  d)  Prolonged rupture of the membranes greater than or equal to 18 hours.  e)  Gestational age less than 37 weeks.    12/24/24 1459 12/24/24 1459  Insert Indwelling Urinary Catheter  Once,   Status:  Canceled        Placed in \"And\" Linked Group    12/24/24 1459 12/24/24 1459  Assess Need for Indwelling Urinary Catheter - Follow Removal Protocol  Continuous,   Status:  Canceled        Comments: Indwelling Urinary Catheter Removal Criteria  Discontinue Indwelling Urinary Catheter Unless One of the Following is Present  Urinary Retention or Obstruction  Chronic Tate Catheter Use  End of Life  Critical Illness with Strict I/O   Tract or Abdominal Surgery  Stage 3/4 Sacral / Perineal Wound  Required Activity Restriction: Trauma  Required Activity Restriction: Spine Surgery  If Patient is Being Followed by Urology Contact Them PRIOR to Removal  Do Not Remove Indwelling Urinary Catheter Order is Present with a CLINICAL REASON to Maintain the Catheter. Provider is Required to Include a Clinical Reason to Maintain a Urinary Catheter    Chronic Tate Catheter Use (Present on Admission)  Assess for Continued Need & Document Medical Necessity  If Infection is Suspected, Contact the Provider       Placed in \"And\" Linked Group    12/24/24 1459 12/24/24 1459  Urinary Catheter Care  Every Shift,   Status:  Canceled      Placed in \"And\" Linked Group    12/24/24 1459 12/24/24 1459  SCD (sequential compression devices)  Once         12/24/24 1459 12/24/24 1459  Document Gatorade Consumption Prior to Admission (Yes or No)  Once         12/24/24 1459    12/24/24 1459  Inpatient Consult to Anesthesiology  Once        Specialty:  Anesthesiology  Provider:  (Not yet assigned)    " 12/24/24 1459    12/24/24 1459  Intermittent Auscultation FOR LOW RISK PATIENTS - NST on Admission Along With Intermittent Auscultation of Fetal Heart Tones.  Per Order Details        Comments: Intermittent Auscultation FOR LOW RISK PATIENTS - NST on Admission Along With Intermittent Auscultation of Fetal Heart Tones.    12/24/24 1459    12/24/24 1458  sodium chloride (NS) irrigation solution 3,000 mL  Once As Needed,   Status:  Discontinued         12/24/24 1459    12/24/24 1245  oxytocin (PITOCIN) 30 units in 0.9% sodium chloride 500 mL (premix)  Titrated         12/24/24 1147    12/24/24 1244  Diet: Diabetic, Regular/House; Consistent Carbohydrate; Fluid Consistency: Thin (IDDSI 0)  Diet Effective Now,   Status:  Canceled         12/24/24 1246    12/24/24 1030  Diet: Liquid, Diabetic; Clear Liquid; Consistent Carbohydrate; Fluid Consistency: Thin (IDDSI 0)  Diet Effective 1030,   Status:  Canceled         12/24/24 0746    12/24/24 0900  prenatal vitamin tablet 1 tablet  Daily,   Status:  Discontinued         12/23/24 1555    12/24/24 0847  Diet: Regular/House, Diabetic; Consistent Carbohydrate; Fluid Consistency: Thin (IDDSI 0)  Diet Effective Now,   Status:  Canceled         12/24/24 0846    12/24/24 0756  methylergonovine (METHERGINE) injection 200 mcg  Once As Needed,   Status:  Discontinued         12/24/24 0756    12/24/24 0756  carboprost (HEMABATE) injection 250 mcg  As Needed,   Status:  Discontinued         12/24/24 0756    12/24/24 0756  miSOPROStol (CYTOTEC) tablet 600 mcg  As Needed,   Status:  Discontinued         12/24/24 0756    12/24/24 0712  Inpatient Admission  Once        Comments: CLARIFICATION ORDER:  INPATIENT STATUS AS OF 12/23/2024 12/24/24 0712    12/24/24 0552  POC Glucose Once  PROCEDURE ONCE        Comments: Complete no more than 45 minutes prior to patient eating      12/24/24 0546    12/24/24 0400  miSOPROStol (CYTOTEC) tablet 25 mcg  Every 4 Hours PRN,   Status:  Discontinued          12/24/24 0408    12/23/24 2100  miSOPROStol (CYTOTEC) split tablet 25 mcg  Every 4 Hours Scheduled         12/23/24 1328    12/23/24 2100  insulin glargine (LANTUS, SEMGLEE) injection 28 Units  2 Times Daily         12/23/24 1555    12/23/24 1730  Insulin Lispro (humaLOG) injection 14-18 Units  3 Times Daily Before Meals,   Status:  Discontinued         12/23/24 1555    12/23/24 1730  Insulin Lispro (humaLOG) injection 2-9 Units  4 Times Daily Before Meals & Nightly         12/23/24 1601    12/23/24 1700  POC Glucose 4x Daily Before Meals & at Bedtime  4 Times Daily Before Meals & at Bedtime      Comments: Complete no more than 45 minutes prior to patient eating      12/23/24 1601    12/23/24 1600  dextrose (GLUTOSE) oral gel 15 g  Every 15 Minutes PRN         12/23/24 1601    12/23/24 1600  dextrose (D50W) (25 g/50 mL) IV injection 25 g  Every 15 Minutes PRN         12/23/24 1601    12/23/24 1600  glucagon HCl (Diagnostic) injection 1 mg  Every 15 Minutes PRN         12/23/24 1601    12/23/24 1600  Vital Signs q 4 while awake  Every 4 Hours      Comments: While the patient is awake.    12/23/24 1328    12/23/24 1538  ABO RH Specimen Verification  STAT         12/23/24 1537    12/23/24 1419  Fentanyl, Urine - Urine, Clean Catch  Once         12/23/24 1418    12/23/24 1415  lactated ringers infusion  Continuous         12/23/24 1328    12/23/24 1415  mineral oil  Once,   Status:  Discontinued         12/23/24 1328    12/23/24 1323  Code Status and Medical Interventions: CPR (Attempt to Resuscitate); Full  Continuous,   Status:  Canceled         12/23/24 1328    12/23/24 1323  Obtain Informed Consent  Once         12/23/24 1328    12/23/24 1323  VTE Prophylaxis Not Indicated: Low Risk; No Risk Factors (0)  Once         12/23/24 1328    12/23/24 1323  Vital Signs Per Hospital Policy  Per Hospital Policy         12/23/24 1328    12/23/24 1323  Confirm Presence of Amniotic Fluid if Patient Presents With SROM  Once          24 1328    24 1323  Mini-Prep Prior to Delivery  Once         24 1328    24 1323  Continuous Fetal Monitoring With NST on Admission and Prior to Initiation of Oxytocin.  Per Order Details        Comments: Continuous Fetal Monitoring With NST on Admission & Prior to Initiation of Oxytocin.    24 1328    24 1323  External Uterine Contraction Monitoring  Per Hospital Policy         24 1328    24 1323  Notify Provider (Specified)  Until Discontinued         24 1328    24 1323  Notify Provider of Tachysystole (Per Hospital Algorithm)  Until Discontinued         24 1328    24 1323  Notify Provider if Membranes Ruptured, Bleeding Greater Than 1 Pad Per Hour, Fetal Heart Tone Abnormality or Severe Pain  Until Discontinued         24 1328    24 1323  May Ambulate if Membranes Intact or Head Engaged With Ruptured BOW or Normal Tracing for 20 Minutes  Until Discontinued         24 1328    24 1323  Initiate Group Beta Strep (GBS) Prophylaxis Protocol, If Criteria Met  Continuous        Comments: NO TREATMENT RECOMMENDED IF: 1) Maternal GBS Status Known Negative 2) Scheduled  Birth With Intact Membranes, Not in Labor 3) Maternal GBS Status Unknown, No Risk Factors  TREAT WITH ANTIBIOTICS IF:  1) Maternal GBS Status Known Positive 2) Maternal GBS Status Unknown With Risk Factors: a)  Previous Infant Affected By GBS Infection b) GBS Urinary Tract Infection (UTI) or Bacteriuria During Pregnancy c) Unexplained Maternal Fever (100.4F (38C) or Greater) During Labor d)  Prolonged Rupture of Membranes (18 or More Hours) e) Gestational Age Less Than 37 Weeks    24 1328    24 1323  Assess Need for Indwelling Urinary Catheter - Follow Removal Protocol  Continuous,   Status:  Canceled        Comments: Indwelling Urinary Catheter Removal Criteria  Discontinue Indwelling Urinary Catheter Unless One of the Following is  "Present:  Urinary Retention or Obstruction  Chronic Urinary Catheter Use  End of Life  Critical Illness with Strict I/O   Tract or Abdominal Surgery  Stage 3/4 Sacral / Perineal Wound  Required Activity Restriction: Trauma  Required Activity Restriction: Spine Surgery  If Patient is Being Followed by Urology Contact Them PRIOR to Removal  Do Not Remove Indwelling Urinary Catheter Order is Present with a CLINICAL REASON to Maintain the Catheter. Provider is Required to Include a Clinical Reason to Maintain a Urinary Catheter    Patient Admitted With Indwelling Urinary Catheter (Not Placed at [facility])  Assess for Continued Need & Document Medical Necessity  If Infection is Suspected, Contact the Provider       Placed in \"And\" Linked Group    12/23/24 1328    12/23/24 1323  Urinary Catheter Care  Every Shift,   Status:  Canceled      Placed in \"And\" Linked Group    12/23/24 1328    12/23/24 1323  Treponema pallidum AB w/Reflex RPR  Once         12/23/24 1328    12/23/24 1323  CBC & Differential  STAT         12/23/24 1328    12/23/24 1323  Urine Drug Screen - Urine, Clean Catch  STAT         12/23/24 1328    12/23/24 1323  Type & Screen  STAT         12/23/24 1328    12/23/24 1323  Insert Peripheral IV  Once         12/23/24 1328    12/23/24 1323  Saline Lock & Maintain IV Access  Continuous,   Status:  Canceled         12/23/24 1328    12/23/24 1323  CBC Auto Differential  PROCEDURE ONCE         12/23/24 1328    12/23/24 1322  ondansetron ODT (ZOFRAN-ODT) disintegrating tablet 4 mg  Every 6 Hours PRN,   Status:  Discontinued        Placed in \"Or\" Linked Group    12/23/24 1328    12/23/24 1322  ondansetron (ZOFRAN) injection 4 mg  Every 6 Hours PRN,   Status:  Discontinued        Placed in \"Or\" Linked Group    12/23/24 1328    12/23/24 1322  terbutaline (BRETHINE) injection 0.2 mg  As Needed,   Status:  Discontinued         12/23/24 1328    12/23/24 1322  sodium chloride (NS) irrigation solution 1,000 mL  " "Once As Needed,   Status:  Discontinued         12/23/24 1328    12/23/24 1322  Insert Indwelling Urinary Catheter  As Needed,   Status:  Canceled      Comments: After epidural PRN.  Perform Nasal Decolonization all patients with calle cath   Placed in \"And\" Linked Group    12/23/24 1328    12/23/24 1322  sodium chloride 0.9 % infusion 40 mL  As Needed,   Status:  Discontinued         12/23/24 1328    12/23/24 1322  lactated ringers bolus 1,000 mL  Once As Needed,   Status:  Discontinued         12/23/24 1328    12/23/24 1322  acetaminophen (TYLENOL) tablet 650 mg  Every 4 Hours PRN,   Status:  Discontinued         12/23/24 1328    12/23/24 1322  butorphanol (STADOL) injection 1 mg  Every 2 Hours PRN,   Status:  Discontinued         12/23/24 1328    12/23/24 1322  butorphanol (STADOL) injection 2 mg  Every 3 Hours PRN,   Status:  Discontinued         12/23/24 1328    12/23/24 1309  Diet: Diabetic; Consistent Carbohydrate; Fluid Consistency: Thin (IDDSI 0)  Diet Effective Now,   Status:  Canceled         12/23/24 1308    12/23/24 1152  Outpatient In A Bed  Once        Comments: No chief complaint on file.      12/23/24 1152    12/23/24 1152  Measure Blood Pressure  Once        Comments: Notify Provider if Greater Than 140/90    12/23/24 1152    12/23/24 1152  Measure Heart Rate  Once         12/23/24 1152    12/23/24 1152  Check Temperature  Once         12/23/24 1152    12/23/24 1152  Check Respiratory Rate  Once         12/23/24 1152    12/23/24 1152  For Antepartum Patients Greater Than 24 Weeks - NST Daily and Monitor Fetal Heart Tones for One Hour 3 Times Daily and PRN.  Per Order Details        Comments: For Antepartum Patients Greater Than 24 Weeks - NST Daily & Monitor Fetal Heart Tones For One Hour 3 Times Daily & PRN.    12/23/24 1152    12/23/24 1152  External Uterine Contraction Monitoring  Per Hospital Policy         12/23/24 1152    12/23/24 1152  Bed Rest With Bathroom Privileges  Once         12/23/24 " 1152    12/23/24 1152  Cervical Check  Once        Comments: If patient is > 36 weeks and presents with complaints of labor.    12/23/24 1152    12/23/24 1152  Collect Amnisure And Send To Lab  Continuous        Comments: If patient presents with complaints of ruptured membranes and not grossly ruptured.    12/23/24 1152    12/23/24 1152  Collect Specimen for FFN And Hold  Continuous        Comments: If patient is < 34 weeks and presents with complaints of labor AND does not meet exclusion criteria (within the past 24 hour: no intercourse, vaginal exams or bleeding)    12/23/24 1152    12/23/24 1152  Notify Provider  Until Discontinued        Comments: Open Order Report to View Parameters Requiring Provider Notification    12/23/24 1152    12/23/24 1152  NPO Diet NPO Type: Ice Chips  Diet Effective Now,   Status:  Canceled         12/23/24 1152    12/23/24 0000  Group B Streptococcus Culture - Swab, Vaginal/Rectum        Comments: This is an external result entered through the Results Console.      12/23/24 1451    Unscheduled  Position Change - For Intra-Uterine Resusitation for Hypertonus, HyperStimulation or Non-Reassuring Fetal Status  As Needed       12/23/24 1328    Unscheduled  Fundal & Lochia Check  As Needed      Comments: Every 15 Minutes x4, Then Every 30 Minutes x2, Then Every Shift    12/24/24 2045    Unscheduled  Follow Hypoglycemia Standing Orders For Blood Glucose <70 & Notify Provider of Treatment  As Needed      Comments: Follow Hypoglycemia Orders As Outlined in Process Instructions (Open Order Report to View Full Instructions)  Notify Provider Any Time Hypoglycemia Treatment is Administered    12/23/24 1601    Unscheduled  Abdominal Prep with Clippers  As Needed       12/24/24 1459    Unscheduled  Chlorhexadine Skin Prep Unless Otherwise Indicated  As Needed       12/24/24 1459    Unscheduled  IV Site Care  As Needed       12/24/24 1716    Unscheduled  Blood Gas, Arterial -With Co-Ox Panel: Yes   As Needed       24 1716    Unscheduled  IV Site Care  As Needed       24    Unscheduled  Blood Gas, Arterial -With Co-Ox Panel: Yes  As Needed       24    Unscheduled  Up with Assistance  As Needed       24    Unscheduled  Bladder Scan if Patient Unable to Void 4-6 Hours After Catheter Removal  As Needed         24    Unscheduled  Straight Cath Every 4-6 Hours As Needed If Patient is Unable to Void After 4-6 Hours, Bladder Scan Volume is Greater Than 350-500mL & Patient Has Symptoms of Bladder Discomfort / Distention  As Needed       24    Unscheduled  Schedule / Prompt Voiding For Patients With Urinary Incontinence  As Needed       24    Unscheduled  Warm compress  As Needed       24    Unscheduled  Apply ace wrap, tight bra, or binder  As Needed       24    Unscheduled  Apply ice packs  As Needed       24                     Operative/Procedure Notes (all)        Darren Jones III, MD at 24 1600  Version 1 of 1         Primary Low Transverse  Section Operation Note    Jeanna La  2024  37w 6d    Surgeon: Dr. Jones  Pre-op Diagnosis:   GHTN. Failed IOL. Type 2 Diabetic: insulin    Post-op Diagnosis:     GHTN. Failed IOL. Type 2 Diabetic: insulin    Procedure(s):   SECTION PRIMARY     Surgeon(s):  Darren Jones III, MD    Anesthesia: Spinal    Staff: See Chart    Estimated Blood Loss: 500cc    Complications: None    Grafts and Implants: NA        Procedure     The patient was prepped and draped in the normal sterile fashion. A Pfannenstiel skin incision was made with the knife and carried down through the underlying fascia. The fascia was nicked in the midline and extended laterally. The peritoneum was entered. The bladder blade was placed. Bladder flaps were created. The uterine incision was made with the knife. The infant was delivered in atraumatic fashion. The nares and mouth  was bulb suctioned. Cord was clamped times 2 and cut. The placenta was delivered intact. The uterus was closed with #1 chromic in a running locking fashion. The fascia was closed with 0 Dexon. The skin was closed with 4-0 Monocryl. The patient tolerated the procedure well and went to the recovery room in stable condition.       APGARS  8 & 9    GENDER  Female        Darren Jones III, MD     Date: 12/24/2024  Time: 16:20 EST    Electronically signed by Darren Jones III, MD at 12/24/24 5539

## 2024-12-26 NOTE — PROGRESS NOTES
" Chu   PROGRESS NOTE    Post-Op Day 2 S/P   Subjective   Subjective  Patient reports:  Pain is controlled with narcotic analgesics including Percocet.  She is up out of bed this am. Tolerating diet. Tolerating po -- normal. Voiding - without difficulty; flatus reported..  Vaginal bleeding is as much as expected.    Objective    Objective:  Vital signs (most recent): Blood pressure 131/82, pulse 84, temperature 97.9 °F (36.6 °C), temperature source Oral, resp. rate 20, height 157.5 cm (62.01\"), weight 93 kg (205 lb), SpO2 99%, not currently breastfeeding.       Vitals: Vital Signs Range for the last 24 hours  Temperature: Temp:  [97.9 °F (36.6 °C)-98.4 °F (36.9 °C)] 97.9 °F (36.6 °C)   Temp Source: Temp src: Oral   BP: BP: (117-131)/(76-82) 131/82   Pulse: Heart Rate:  [] 84   Respirations: Resp:  [18-20] 20   Weight:              Physical Exam    Lungs clear to auscultation bilaterally   Abdomen Soft, ND, tender along incision. + BS   Incision  Clean, dry, intact   Extremities extremities normal, atraumatic, no cyanosis or edema         [unfilled]       Lab Results   Component Value Date    ABO O 2024    RH Positive 2024        Lab Results   Component Value Date    HGB 9.3 (L) 2024    HCT 28.2 (L) 2024       Assessment & Plan        Hypertension affecting pregnancy    IUP (intrauterine pregnancy), incidental    Assessment & Plan    Assessment:    Jeanna La is Day 2  post-partum  , Low Transverse  .      Plan:  continue post op care.      Carolina Khan, DO  24  09:48 EST    "

## 2024-12-27 VITALS
BODY MASS INDEX: 37.73 KG/M2 | TEMPERATURE: 98.3 F | OXYGEN SATURATION: 97 % | WEIGHT: 205 LBS | DIASTOLIC BLOOD PRESSURE: 77 MMHG | RESPIRATION RATE: 18 BRPM | HEART RATE: 74 BPM | HEIGHT: 62 IN | SYSTOLIC BLOOD PRESSURE: 146 MMHG

## 2024-12-27 PROBLEM — Z33.1 IUP (INTRAUTERINE PREGNANCY), INCIDENTAL: Status: RESOLVED | Noted: 2024-12-24 | Resolved: 2024-12-27

## 2024-12-27 PROBLEM — O42.90 AMNIOTIC FLUID LEAKING: Status: RESOLVED | Noted: 2024-09-13 | Resolved: 2024-12-27

## 2024-12-27 RX ORDER — PSEUDOEPHEDRINE HCL 30 MG
100 TABLET ORAL 2 TIMES DAILY PRN
Qty: 20 CAPSULE | Refills: 0 | Status: SHIPPED | OUTPATIENT
Start: 2024-12-27

## 2024-12-27 RX ORDER — IBUPROFEN 600 MG/1
600 TABLET, FILM COATED ORAL EVERY 6 HOURS
Qty: 40 TABLET | Refills: 0 | Status: SHIPPED | OUTPATIENT
Start: 2024-12-27 | End: 2025-01-06

## 2024-12-27 RX ORDER — OXYCODONE HYDROCHLORIDE 5 MG/1
5 TABLET ORAL EVERY 4 HOURS PRN
Qty: 12 TABLET | Refills: 0 | Status: SHIPPED | OUTPATIENT
Start: 2024-12-27 | End: 2024-12-29

## 2024-12-27 RX ADMIN — IBUPROFEN 600 MG: 600 TABLET, FILM COATED ORAL at 00:22

## 2024-12-27 RX ADMIN — IBUPROFEN 600 MG: 600 TABLET, FILM COATED ORAL at 09:07

## 2024-12-27 RX ADMIN — HYDROCODONE BITARTRATE AND ACETAMINOPHEN 1 TABLET: 5; 325 TABLET ORAL at 09:08

## 2024-12-27 RX ADMIN — ACETAMINOPHEN 650 MG: 325 TABLET ORAL at 03:44

## 2024-12-27 NOTE — PROGRESS NOTES
Section Progress Note    Assessment & Plan     Status post  section: Doing well postoperatively.     Discharge home with standard precautions and return to clinic in 2 and 6 weeks.    Subjective     Postpartum Day 3:  Delivery    The patient feels well. The patient denies emotional concerns. Pain is moderately controlled with current medications. The baby isin NICU.  Urinary output is not recorded. The patient is ambulating well. The patient is tolerating a normal diet. Patient denies vomiting, no flatus.    Objective     Vital signs in last 24 hours:  Temp:  [98.2 °F (36.8 °C)-98.3 °F (36.8 °C)] 98.3 °F (36.8 °C)  Heart Rate:  [74-91] 74  Resp:  [18] 18  BP: (122-146)/(65-84) 146/77      General:    alert, appears stated age, and cooperative   Bowel Sounds:  active   Lochia:  appropriate   Uterine Fundus:   firm   Incision:  [place]d with provena   DVT Evaluation:  No evidence of DVT seen on physical exam.

## 2024-12-27 NOTE — PLAN OF CARE
Problem: Adult Inpatient Plan of Care  Goal: Plan of Care Review  Outcome: Progressing  Flowsheets  Taken 12/27/2024 0321  Progress: improving  Plan of Care Reviewed With: patient  Taken 12/25/2024 0334  Outcome Evaluation: vitals and bleeding wnl, fundus firm prevena over incision, clean and dry  Goal: Patient-Specific Goal (Individualized)  Outcome: Progressing  Goal: Absence of Hospital-Acquired Illness or Injury  Outcome: Progressing  Intervention: Identify and Manage Fall Risk  Description: Perform standard risk assessment on admission using a validated tool or comprehensive approach appropriate to the patient; reassess fall risk frequently, with change in status or transfer to another level of care.  Communicate risk to interprofessional healthcare team; ensure fall risk visible cue.  Determine need for increased observation, equipment and environmental modification, as well as use of supportive, nonskid footwear.  Adjust safety measures to individual needs and identified risk factors.  Reinforce the importance of active participation with fall risk prevention, safety, and physical activity with the patient and family.  Perform regular intentional rounding to assess need for position change, pain assessment and personal needs, including assistance with toileting.  Flowsheets (Taken 12/26/2024 1956)  Safety Promotion/Fall Prevention:   safety round/check completed   nonskid shoes/slippers when out of bed  Intervention: Prevent Infection  Description: Maintain skin and mucous membrane integrity; promote hand, oral and pulmonary hygiene.  Optimize fluid balance, nutrition, sleep and glycemic control to maximize infection resistance.  Identify potential sources of infection early to prevent or mitigate progression of infection (e.g., wound, lines, devices).  Evaluate ongoing need for invasive devices; remove promptly when no longer indicated.  Review vaccination status.  Flowsheets (Taken 12/24/2024 0725 by  Christina Cabral, RN)  Infection Prevention:   visitors restricted/screened   rest/sleep promoted  Goal: Optimal Comfort and Wellbeing  Outcome: Progressing  Intervention: Monitor Pain and Promote Comfort  Description: Assess pain level, treatment efficacy and patient response at regular intervals using a consistent pain scale.  Consider the presence and impact of preexisting chronic pain.  Encourage patient and caregiver involvement in pain assessment, interventions and safety measures.  Promote activity; balance with sleep and rest to enhance healing.  Flowsheets (Taken 12/26/2024 1956)  Pain Management Interventions: pain medication given  Intervention: Provide Person-Centered Care  Description: Use a family-focused approach to care; encourage support system presence and participation.  Develop trust and rapport by proactively providing information, encouraging questions, addressing concerns and offering reassurance.  Acknowledge emotional response to hospitalization.  Recognize and utilize personal coping strategies and strengths; develop goals via shared decision-making.  Honor spiritual and cultural preferences.  Flowsheets (Taken 12/26/2024 1956)  Trust Relationship/Rapport:   care explained   choices provided  Goal: Readiness for Transition of Care  Outcome: Progressing  Intervention: Mutually Develop Transition Plan  Description: Identify available resources for support (e.g., family, friends, community).  Identify and address barriers to ongoing treatment and home management (e.g., environmental, financial).  Provide opportunities to practice self-management skills.  Assess and monitor emotional readiness for transition.  Establish or reconnect linkage with outpatient providers or community-based services.  Flowsheets  Taken 12/25/2024 0334 by Christina Calderon RN  Equipment Currently Used at Home: none  Concerns to be Addressed: no discharge needs identified  Readmission Within the Last 30 Days:  no previous admission in last 30 days  Patient/Family Anticipates Transition to: home  Taken 2024 0602 by Dory Casillas, RN  Transportation Anticipated: family or friend will provide  Patient/Family Anticipated Services at Transition: none     Problem: Postpartum ( Delivery)  Goal: Successful Parent Role Transition  Outcome: Progressing  Goal: Hemostasis  Outcome: Progressing  Goal: Effective Bowel Elimination  Outcome: Progressing  Goal: Fluid and Electrolyte Balance  Outcome: Progressing  Goal: Absence of Infection Signs and Symptoms  Outcome: Progressing  Goal: Anesthesia/Sedation Recovery  Outcome: Progressing  Intervention: Optimize Anesthesia Recovery  Description: Assess and monitor airway, breathing and circulation; maintain close surveillance for deterioration.  Implement continuous monitoring, such as cardiorespiratory, blood pressure, temperature, pulse oximetry and capnography.  Elevate head of bed, if able; facilitate regular position changes.  Assess neurocognitive function and for risks that may lead to postoperative delirium, such as decreased level of consciousness, pain and agitation; offer reassurance; answer questions.  Assess and monitor neurovascular and neuromuscular function, such as motor strength, tone, posture, peripheral pulses and extremity sensation; protect areas of decreased sensation from heat, cold, medical devices or objects.  Individualize frequency and intensity of monitoring based on sedation or anesthesia administered, identified risk factors, ongoing assessment and organizational protocol.  Prepare for administration of pharmacologic therapy, such as reversal agent, antiemetic or antipruritic medication, to manage sedation or anesthesia effects.  Adjust environment to maintain safety (e.g., fall precautions, safety equipment).  Recent Flowsheet Documentation  Taken 2024 by Christina Calderon RN  Safety Promotion/Fall Prevention:   safety round/check  completed   nonskid shoes/slippers when out of bed  Goal: Optimal Pain Control and Function  Outcome: Progressing  Intervention: Prevent or Manage Pain  Description: Set pain management goals; mutually determine pain management plan and review plan regularly.  Use a consistent, validated tool for pain assessment including function and quality of life; evaluate pain level, effect of treatment and patient's response at regular intervals.  Match pharmacologic analgesia to severity and type of pain mechanism; evaluate risk for opioid use and dependence; consider multimodal approach and titrate to patient response.  Provide around-the-clock dosing of pain medication to keep pain levels in control.  Manage medication-induced effects, such as constipation, nausea, pruritus, urinary retention, somnolence and dizziness.  Provide multimodal interventions, such as physical activity, therapeutic exercise, yoga, TENS (transcutaneous electrical nerve stimulation) and manual therapy; consider addition of complementary or alternative therapy.  Use cold application, as culturally-appropriate, to the incisional area for the first 24 to 48 hours to enhance comfort.  Encourage early ambulation, when able, to help avoid gas accumulation which can add to discomfort.  Verify correct infant latch when breastfeeding to prevent nipple pain.  If engorgement occurs, encourage more frequent breastfeeding or pumping and storing additional milk to ease discomfort. Cold compresses, as culturally-appropriate, may be used if bottle-feeding.  If postdural puncture headache identified, encourage adequate hydration and anticipate the need for epidural blood patch.  If hemorrhoids are present and painful, offer topical pain relief and sitz baths for comfort.  Consider and address emotional response to pain.  Modify pain perception by using techniques, such as distraction, mindfulness, guided imagery, meditation or music.  Recent Flowsheet  Documentation  Taken 12/26/2024 1956 by Christina Calderon RN  Pain Management Interventions: pain medication given  Goal: Nausea and Vomiting Relief  Outcome: Progressing  Goal: Effective Urinary Elimination  Outcome: Progressing  Intervention: Monitor and Manage Urinary Retention  Description: Monitor fluid balance to promote optimal hydration.  Advocate for early removal of indwelling urinary catheter.  Promote behavioral methods to enhance voiding ability, that may include relaxation techniques, mutually established regular elimination schedule, adequate time for bladder emptying, as well as positioning to optimize flow.  Implement methods to facilitate elimination (e.g., running water, warm water over perineum, sitz bath, privacy).  Utilize portable bladder ultrasound to assess pre- and postvoid volumes.  Anticipate the need for intermittent catheterization if other methods are unsuccessful and bladder ultrasound reveals large volume.  Facilitate urogenital hygiene to maintain perineal skin integrity.  Promote early mobilization to improve return of urinary tract function.  Recent Flowsheet Documentation  Taken 12/26/2024 1956 by Christina Calderon RN  Urinary Elimination Promotion: frequent voiding encouraged  Goal: Effective Oxygenation and Ventilation  Outcome: Progressing   Goal Outcome Evaluation:  Plan of Care Reviewed With: patient        Progress: improving  Outcome Evaluation: vitals and bleeding wnl, fundus firm prevena over incision, clean and dry

## 2024-12-27 NOTE — LACTATION NOTE
Instructed mom and dad on Infant CPR. Mom and dad demonstrated and verbalized understanding. No other needs needed at this time.

## 2024-12-27 NOTE — CASE MANAGEMENT/SOCIAL WORK
Case Management/Social Work    Patient Name:  Jeanna La  YOB: 2000  MRN: [id number]  Admit Date:  12/23/2024    SS received consult for 14 on [place] Postnantal Depression Scale. SS spoke with pt on this date and discussed resources. Pt voiced to not needing any resources at this time. Pt states she received prenatal care a [facility] and the clinic offer counseling there if she was to need it. SS did inform pt that [facility] off Counseling if it was needed and pt voiced understanding.       Electronically signed by:  CLIVE Coleman  12/27/24 09:03 EST

## 2024-12-27 NOTE — DISCHARGE SUMMARY
MANOHAR Saeg  Delivery Discharge Summary    Primary OB Clinician:     EDC: Estimated Date of Delivery: 25    Gestational Age:37w6d    Antepartum complications: hypertension, diabetes    Date of Delivery: 2024  Time of Delivery: 3:57 PM    Delivered By:  Darren Jones Iii    Delivery Type: , Low Transverse     Tubal Ligation: n/a    Baby:female infant;   Apgar:  7  @ 1 minute /   Apgar:  7  @ 5 minutes   Weight: 2795 g (6 lb 2.6 oz)   Length: 18.701    Anesthesia: Spinal     Intrapartum complications: failed induction    Laceration: No    Episiotomy: No    Placenta: Expressed    Feeding method: Breastfeeding Status: No    Rh Immune globulin given: not applicable    Rubella vaccine given: not applicable    Discharge Date: 2024; Discharge Time: 09:31 EST    Early Discharge:  NO    Plan:    Follow-up appointment with your OB in 3 days for BP check.

## 2024-12-28 NOTE — PAYOR COMM NOTE
"[facility]  BHARATHI MCCLURE  PHONE  [phone]  FAX  [phone]  NPI:  [id number]    PATIENT D/C 12/27/2024    Jeanna Barbour (24 y.o. Female)       Date of Birth   2000    Social Security Number       Address   [address] [place] [id number]    Home Phone       MRN   [id number]       Spiritism   None    Marital Status   Single                            Admission Date   12/23/24    Admission Type   Elective    Admitting Provider   Carolina Khan DO    Attending Provider       Department, Room/Bed   [facility], W246/1       Discharge Date   12/27/2024    Discharge Disposition   Home or Self Care    Discharge Destination                                 Attending Provider: (none)   Allergies: No Known Allergies    Isolation: None   Infection: None   Code Status: Prior    Ht: 157.5 cm (62.01\")   Wt: 93 kg (205 lb)    Admission Cmt: None   Principal Problem: Hypertension affecting pregnancy [O16.9]                   Active Insurance as of 12/23/2024       Primary Coverage       Payor Plan Insurance Group Employer/Plan Group    [employer] [place] [employer] [place]        Payor Plan Address Payor Plan Phone Number Payor Plan Fax Number Effective Dates    [address] [address] [phone]  8/19/2019 - None Entered    [facility] [id number]         Subscriber Name Subscriber Birth Date Member ID       JEANNA BARBOUR 2000 [id number]                     Emergency Contacts        (Rel.) Home Phone Work Phone Mobile Phone    PAPO RIHCARDS (Grandparent) -- -- [phone]                 Discharge Summary        Marie He DO at 12/27/24 0928          [facility]  Delivery Discharge Summary    Primary OB Clinician:     EDC: Estimated Date of Delivery: 1/8/25    Gestational Age:37w6d    Antepartum complications: hypertension, diabetes    Date of Delivery: 12/24/2024  Time of Delivery: 3:57 PM    Delivered By:  Darren Jones Iii    Delivery Type: " , Low Transverse     Tubal Ligation: n/a    Baby:female infant;   Apgar:  7  @ 1 minute /   Apgar:  7  @ 5 minutes   Weight: 2795 g (6 lb 2.6 oz)   Length: 18.701    Anesthesia: Spinal     Intrapartum complications: failed induction    Laceration: No    Episiotomy: No    Placenta: Expressed    Feeding method: Breastfeeding Status: No    Rh Immune globulin given: not applicable    Rubella vaccine given: not applicable    Discharge Date: 2024; Discharge Time: 09:31 EST    Early Discharge:  NO    Plan:    Follow-up appointment with your OB in 3 days for BP check.    Electronically signed by Marie He DO at 24 0938

## 2024-12-30 ENCOUNTER — LACTATION ENCOUNTER (OUTPATIENT)
Dept: NURSERY | Facility: HOSPITAL | Age: 24
End: 2024-12-30

## 2024-12-30 NOTE — LACTATION NOTE
This note was copied from a baby's chart.     12/30/24 4778   Maternal Information   Date of Referral 12/30/24   Person Making Referral lactation consultant   Maternal Reason for Referral latch difficulty;no prior breastfeeding experience;separation from infant  (follow up prior to discharge)   Infant Reason for Referral NICU admission;separation from mother  (encouraged burping before breastfeeding, removing clothing for skin contact, and stimulation throughout breastfeeding for quality milk transfer during breastfeeding session)   Maternal Assessment   Breast Shape Bilateral:;round   Breast Density Bilateral:;full   Nipples bulbous   Left Nipple Symptoms intact;tender   Right Nipple Symptoms intact;tender   Maternal Infant Feeding   Maternal Emotional State independent;receptive;relaxed   Infant Positioning clutch/football;cross-cradle   Signs of Milk Transfer audible swallow;breasts soften with feeding;deep jaw excursions noted;suck/swallow ratio;transfer present   Pain with Feeding yes  (Mompumped while infant was in NICU. She states her nipples are sore. Mom stated when she breastfeed before that infant was really hurting. Helped with latch and instructed that mom needs to use finger to help break suction before pulling out nipple)   Pain Location nipples, bilateral   Pain Description soreness   Comfort Measures Before/During Feeding infant position adjusted;latch adjusted;maternal position adjusted;suction broken using finger  (nipple shield and proper placement education/demonstrated completed.Hand expression demonstrated, milk expressed easily)   Milk Ejection Reflex present   Comfort Measures Following Feeding air-drying encouraged;breast cream/oil applied;hydrogel applied   Prefeeding Nipple Condition pink   Postfeeding Nipple Condition pink   Nipple Shape After Feeding, Left Breast round   Nipple Shape After Feeding, Right round   Latch Assistance minimal assistance;verbal guidance offered   Support  Person Involvement actively supporting mother;verbally supports mother   Breastfeeding Supplementation   Breastfeeding Supplementation Type expressed breast milk   Method of Supplementation bottle     Completed breastfeeding education encouraging pt to achieve a deep, comfortable latch throughout breastfeeding which should be at least every 3 hours while giving baby stimulation for high quality transfer of breastmilk. Alternatively, pumping encouraged every three hours, or at baby's feeding times for optimal milk initiation/ production.  All questions answered at this time. PRN Lactation Consultant/Clinic contact encouraged

## 2025-01-07 ENCOUNTER — MATERNAL SCREENING (OUTPATIENT)
Dept: CALL CENTER | Facility: HOSPITAL | Age: 25
End: 2025-01-07
Payer: COMMERCIAL

## 2025-01-07 NOTE — OUTREACH NOTE
Maternal Screening Survey      Flowsheet Row Responses   Facility patient discharged from? [facility]   Attempt successful? Yes   Call start time 1250   Call end time 1252   I have been able to laugh and see the funny side of things. 0   I have looked forward with enjoyment to things. 0   I have blamed myself unnecessarily when things went wrong. 0   I have been anxious or worried for no good reason. 0   I have felt scared or panicky for no good reason. 0   Things have been getting on top of me. 0   I have been so unhappy that I have had difficulty sleeping. 0   I have felt sad or miserable. 0   I have been so unhappy that I have been crying. 0   The thought of harming myself has occurred to me. 0   [place]  Depression Scale Total 0   Did any of your parents have problems with alcohol or drug use? No   Do any of your peers have problems with alcohol or drug use? No   Does your partner have problems with alcohol or drug use? No   Before you were pregnant did you have problems with alcohol or drug use? (past) No   In the past month, did you drink beer, wine, liquor or use any other drugs? (pregnancy) No   Maternal Screening call completed Yes   Call end time 1252              Karissa RIOJAS - Registered Nurse

## (undated) DEVICE — SKIN AFFIX SURG ADHESIVE 72/CS 0.55ML: Brand: MEDLINE

## (undated) DEVICE — HYDROGEL COATED LATEX URINE METER FOLEY TRAY,16 FR/CH (5.3 MM), 5 ML CATHETER PRE-CONNECTED TO 2000 ML DRAINAGE BAG WITH NEEDLE SAMPLING: Brand: DOVER

## (undated) DEVICE — PK C/SECT 70

## (undated) DEVICE — TRY SPINE PENCAN 24GA X4IN

## (undated) DEVICE — PREVENA VAC CONNECTOR: Brand: PREVENA™ V.A.C.®

## (undated) DEVICE — BG RESUSCITATOR INFANT BABYBLUE2

## (undated) DEVICE — CANSTR PREVENA 45ML

## (undated) DEVICE — APPL CHLORAPREP W/TINT 26ML ORNG